# Patient Record
Sex: FEMALE | Race: OTHER | HISPANIC OR LATINO
[De-identification: names, ages, dates, MRNs, and addresses within clinical notes are randomized per-mention and may not be internally consistent; named-entity substitution may affect disease eponyms.]

---
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
## 2021-04-13 ENCOUNTER — TRANSCRIPTION ENCOUNTER (OUTPATIENT)
Age: 21
End: 2021-04-13

## 2021-12-18 ENCOUNTER — EMERGENCY (EMERGENCY)
Facility: HOSPITAL | Age: 21
LOS: 0 days | Discharge: HOME | End: 2021-12-18
Payer: COMMERCIAL

## 2021-12-18 VITALS
TEMPERATURE: 98 F | HEIGHT: 60 IN | OXYGEN SATURATION: 100 % | DIASTOLIC BLOOD PRESSURE: 67 MMHG | SYSTOLIC BLOOD PRESSURE: 110 MMHG | WEIGHT: 119.93 LBS | HEART RATE: 72 BPM | RESPIRATION RATE: 19 BRPM
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
## 2021-12-18 DIAGNOSIS — M79.644 PAIN IN RIGHT FINGER(S): ICD-10-CM
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
## 2021-12-18 LAB — HCG UR QL: NEGATIVE — SIGNIFICANT CHANGE UP

## 2021-12-18 PROCEDURE — 99283 EMERGENCY DEPT VISIT LOW MDM: CPT

## 2021-12-18 PROCEDURE — 73130 X-RAY EXAM OF HAND: CPT | Mod: 26,RT

## 2021-12-18 RX ORDER — ACETAMINOPHEN 500 MG
650 TABLET ORAL ONCE
Refills: 0 | Status: COMPLETED | OUTPATIENT
Start: 2021-12-18 | End: 2021-12-18

## 2021-12-18 RX ORDER — ACETAMINOPHEN 500 MG
1 TABLET ORAL
Qty: 28 | Refills: 0
Start: 2021-12-18 | End: 2021-12-24

## 2021-12-18 RX ADMIN — Medication 650 MILLIGRAM(S): at 18:26

## 2021-12-18 RX ADMIN — Medication 650 MILLIGRAM(S): at 19:00

## 2021-12-18 NOTE — ED PROVIDER NOTE - OBJECTIVE STATEMENT
21 female UCG pending no PMhx presented to the ED with complaint of intermittent sharp right thumb pain x 1 days after trying to punch and over extended right thumb, no radiation, worst with palpation, no alleviating factors. denies fall, injury, trauma, loc, syncope, weakness, tingling, numbness, lost of motion, decrease sensory

## 2021-12-18 NOTE — ED PROVIDER NOTE - NSFOLLOWUPCLINICS_GEN_ALL_ED_FT
[facility] - Primary Care  Primary Care  [address] [place] [address]  Phone: [phone]  Fax:

## 2021-12-18 NOTE — ED PROVIDER NOTE - PATIENT PORTAL LINK FT
You can access the FollowMyHealth Patient Portal offered by [facility] by registering at the following website: [email or web address]. By joining [employer]’s FollowMyHealth portal, you will also be able to view your health information using other applications (apps) compatible with our system.

## 2021-12-18 NOTE — ED PROVIDER NOTE - NSFOLLOWUPINSTRUCTIONS_ED_ALL_ED_FT
Please schedule to follow up within 3 days for right thumb pain     [facility] - Primary Care   Primary Care  [address] [place] [address]  Phone: [phone]  Fax:     please take tylenol 325 mg every 4 hours as needed for pain       please return to the ED for worsening symptoms

## 2021-12-18 NOTE — ED PROVIDER NOTE - CLINICAL SUMMARY MEDICAL DECISION MAKING FREE TEXT BOX
21 female UCG pending no PMhx presented to the ED with complaint of intermittent sharp right thumb pain x 1 days after trying to punch and over extended right thumb    imp :  MSK pain bs soft tissue swelling r/o fx or dislocation     plan   UCG pending   Dx right hand negative   tylenol   rest, ice, compress, elevation   patient education   PCP referral   reassess

## 2021-12-18 NOTE — ED ADULT NURSE NOTE - OBJECTIVE STATEMENT
pt states she was play fighting and right thumb bend  backwards,  pt states she feels tingling. swelling noted around site. no full ROM.

## 2021-12-18 NOTE — ED PROVIDER NOTE - PHYSICAL EXAMINATION
General appearance AAox3 nontoxic looking afebrile sitting in bed in NAD, breathing comfortably, speak full sentence, no tripoding  Head : NCAT, no facial swelling, no bruise, no laceration, non TTP,  EYE : b/l eyes EOMI/PERRL, no nystagmus.   Mouth : Oropharynx moist and patent, no swelling, no edema, no tongue swelling, uvula midline. no exudate, no rash, no lesion   Neck : no cervical/paracervical spine tenderness, no meningeal signs, no neck rigidity, no lymph node swelling. FROM, no body step off  chest : no bruise, no deformity, non TTP, chest expanding equally b/l, no crepitus, no swelling,   lung : CTAB no w/r/r  abdomen : soft non tender, no distended, + BS x 4 quadrant, no guarding, no CVA tenderness, no organomegaly  spine :  moving all extremity equally b/l skin intact, dry not warm to touch. no bruise, no deformity, no bony step off, no erythema, no swelling, no infection. non TTP. FROM, no sensory deficit, distal pulse intact, cap refill < 2 second, able to bare weight. strength 5/5   right hand : right palmar proximal thumb swelling with ttp proximal palmar thumb, skin dry not warm to touch. no bruise, no deformity, no bony step off, no infection. FROM, no sensory deficit, distal pulse intact, cap refill < 2 second,  strength 5/5

## 2022-06-17 ENCOUNTER — EMERGENCY (EMERGENCY)
Facility: HOSPITAL | Age: 22
LOS: 0 days | Discharge: HOME | End: 2022-06-17
Payer: COMMERCIAL

## 2022-06-17 VITALS
RESPIRATION RATE: 19 BRPM | DIASTOLIC BLOOD PRESSURE: 57 MMHG | OXYGEN SATURATION: 98 % | SYSTOLIC BLOOD PRESSURE: 102 MMHG | HEART RATE: 91 BPM | HEIGHT: 60 IN | TEMPERATURE: 99 F | WEIGHT: 130.07 LBS

## 2022-06-17 VITALS
OXYGEN SATURATION: 99 % | HEART RATE: 85 BPM | DIASTOLIC BLOOD PRESSURE: 60 MMHG | RESPIRATION RATE: 20 BRPM | TEMPERATURE: 98 F | SYSTOLIC BLOOD PRESSURE: 105 MMHG

## 2022-06-17 DIAGNOSIS — M25.511 PAIN IN RIGHT SHOULDER: ICD-10-CM
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
## 2022-06-17 DIAGNOSIS — R20.0 ANESTHESIA OF SKIN: ICD-10-CM

## 2022-06-17 DIAGNOSIS — M25.572 PAIN IN LEFT ANKLE AND JOINTS OF LEFT FOOT: ICD-10-CM

## 2022-06-17 DIAGNOSIS — M79.672 PAIN IN LEFT FOOT: ICD-10-CM
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
## 2022-06-17 DIAGNOSIS — R20.2 PARESTHESIA OF SKIN: ICD-10-CM

## 2022-06-17 PROCEDURE — 73630 X-RAY EXAM OF FOOT: CPT | Mod: 26,LT

## 2022-06-17 PROCEDURE — 73600 X-RAY EXAM OF ANKLE: CPT | Mod: 26,LT

## 2022-06-17 PROCEDURE — 73030 X-RAY EXAM OF SHOULDER: CPT | Mod: 26,RT

## 2022-06-17 PROCEDURE — 99284 EMERGENCY DEPT VISIT MOD MDM: CPT

## 2022-06-17 PROCEDURE — 73590 X-RAY EXAM OF LOWER LEG: CPT | Mod: 26,LT

## 2022-06-17 RX ORDER — ACETAMINOPHEN 500 MG
650 TABLET ORAL ONCE
Refills: 0 | Status: COMPLETED | OUTPATIENT
Start: 2022-06-17 | End: 2022-06-17

## 2022-06-17 RX ORDER — IBUPROFEN 200 MG
600 TABLET ORAL ONCE
Refills: 0 | Status: COMPLETED | OUTPATIENT
Start: 2022-06-17 | End: 2022-06-17

## 2022-06-17 RX ADMIN — Medication 600 MILLIGRAM(S): at 21:46

## 2022-06-17 RX ADMIN — Medication 650 MILLIGRAM(S): at 21:46

## 2022-06-17 NOTE — ED PROVIDER NOTE - CARE PROVIDER_API CALL
Jay Victoria)  Orthopaedic Surgery  [address]  [place] [address]  Phone: [phone]  Fax: [phone]  Follow Up Time:

## 2022-06-17 NOTE — ED PROVIDER NOTE - NSFOLLOWUPCLINICS_GEN_ALL_ED_FT
[facility] Orthopedic Surgery  Orthopedic Surgery  [address], 3rd & 4th [place] [address]  Phone: [phone]  Fax:     [facility]  Orthopedic Surgery  [address] [address]  Phone: [phone]  Fax:     [facility]  Podiatry  [address], [facility] - 3rd [place] [address]  Phone: [phone]  Fax:

## 2022-06-17 NOTE — ED PROVIDER NOTE - WR INTERPRETATION DATE TIME  2
17-Jun-2022 21:33 Mohs Method Verbiage: An incision at a 45 degree angle following the standard Mohs approach was done and the specimen was harvested as a microscopic controlled layer.
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
## 2022-06-17 NOTE — ED PROVIDER NOTE - NSFOLLOWUPINSTRUCTIONS_ED_ALL_ED_FT
Today you were seen in the ER for right shoulder and left ankle/foot pain.     Take Motrin 600 mg every 8 hours as needed for moderate pain-- take with food..    Take Tylenol 650mg (Two 325 mg pills) every 4-6 hours as needed for pain.    Rest, Ice, Elevate injured area    Wear ace wrap, ortho shoe and crutches as directed.     Follow-up with Orthopedics or Podiatry, See referred doctor. Call today / next business day for close, prompt follow-up.    Return to Emergency room for any worsening or persistent pain, weakness, numbness, fever, color change to extremity, or any other concerning symptoms.    Advance activity as tolerated.     Continue all previously prescribed medications as directed unless otherwise instructed.     Follow up with your primary care physician in 48-72 hours- bring copies of your results.
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
## 2022-06-17 NOTE — ED PROVIDER NOTE - PATIENT PORTAL LINK FT
You can access the FollowMyHealth Patient Portal offered by [facility] by registering at the following website: [email or web address]. By joining [employer]’s FollowMyHealth portal, you will also be able to view your health information using other applications (apps) compatible with our system.
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
## 2022-06-17 NOTE — ED ADULT NURSE NOTE - SUICIDE SCREENING QUESTION 1
Labor Progress Note    Hang Harris is a 38 y.o. female  at 44w3d  The patient was seen and examined. Her pain is under control with epidural in place.     Vital Signs:  Vitals:    04/10/19 2057 04/10/19 2144 04/10/19 2215 04/10/19 2246   BP: 138/65 118/64 116/64 115/61   Pulse: 72 67 74 64   Resp:  16     Temp:       TempSrc:       SpO2:       Weight:       Height:           FHT: 130, moderate variability, accelerations present, decelerations absent  Contractions: regular, every 3 minutes  Cervical Exam: 4 cm dilated, 60 effaced, -1 station  Pitocin: @ 4 mu/min    Membranes: Intact  Scalp Electrode in place: absent  Intrauterine Pressure Catheter in Place: absent    Assessment/Plan:  Hang Harris is a 38 y.o. female  at 44w3d for elective IOL   - GBS negative, No indication for GBS prophylaxis   - s/p cervidil    - epidural in place   - pitocin per protocol   - continue expectant management     Sania Beaver DO  Ob/Gyn Resident  4/10/2019, 10:51 PM No

## 2022-06-17 NOTE — ED PROVIDER NOTE - PROGRESS NOTE DETAILS
CARLITOS Alfonso: difficulty ambulating secondary to pain on foot. Will give pain medications, ace wrap, surgical shoe and dc with crutches and ortho/podiatry follow up.

## 2022-06-20 ENCOUNTER — FORM ENCOUNTER (OUTPATIENT)
Age: 22
End: 2022-06-20

## 2022-06-21 ENCOUNTER — APPOINTMENT (OUTPATIENT)
Dept: ORTHOPEDIC SURGERY | Facility: CLINIC | Age: 22
End: 2022-06-21
Payer: COMMERCIAL

## 2022-06-21 ENCOUNTER — APPOINTMENT (OUTPATIENT)
Dept: MRI IMAGING | Facility: CLINIC | Age: 22
End: 2022-06-21
Payer: COMMERCIAL

## 2022-06-21 VITALS — BODY MASS INDEX: 26.11 KG/M2 | WEIGHT: 133 LBS | HEIGHT: 60 IN

## 2022-06-21 PROCEDURE — 99204 OFFICE O/P NEW MOD 45 MIN: CPT

## 2022-06-21 PROCEDURE — 73630 X-RAY EXAM OF FOOT: CPT | Mod: LT

## 2022-06-21 PROCEDURE — L4361: CPT

## 2022-06-21 PROCEDURE — 73718 MRI LOWER EXTREMITY W/O DYE: CPT | Mod: LT

## 2022-06-21 NOTE — PHYSICAL EXAM
[Left] : left foot and ankle [Moderate] : moderate swelling of dorsal foot [5___] : [facility] 5[unfilled]/5 [2+] : dorsalis pedis pulse: 2+ [] : ambulation with crutches [de-identified] : splt but reports decreased sensation at toes [de-identified] : plantar flexion 20 degrees [TWNoteComboBox7] : dorsiflexion 5 degrees
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
## 2022-06-23 ENCOUNTER — FORM ENCOUNTER (OUTPATIENT)
Age: 22
End: 2022-06-23

## 2022-06-23 ENCOUNTER — APPOINTMENT (OUTPATIENT)
Dept: ORTHOPEDIC SURGERY | Facility: CLINIC | Age: 22
End: 2022-06-23
Payer: COMMERCIAL

## 2022-06-23 VITALS — HEIGHT: 60 IN | BODY MASS INDEX: 26.11 KG/M2 | WEIGHT: 133 LBS

## 2022-06-23 DIAGNOSIS — S92.242A DISPLACED FRACTURE OF MEDIAL CUNEIFORM OF LEFT FOOT, INITIAL ENCOUNTER FOR CLOSED FRACTURE: ICD-10-CM

## 2022-06-23 PROCEDURE — 99214 OFFICE O/P EST MOD 30 MIN: CPT

## 2022-06-23 PROCEDURE — 73030 X-RAY EXAM OF SHOULDER: CPT | Mod: RT

## 2022-06-23 NOTE — DATA REVIEWED
[MRI] : MRI [Left] : left [Foot] : foot [I reviewed the films/CD and additionally noted] : I reviewed the films/CD and additionally noted [FreeTextEntry1] : lisfranc tear; med cun fx
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
## 2022-06-23 NOTE — PHYSICAL EXAM
[Left] : left foot and ankle [Moderate] : moderate swelling of dorsal foot [5___] : [facility] 5[unfilled]/5 [2+] : dorsalis pedis pulse: 2+ [Right] : right shoulder [4 ___] : forward flexion 4[unfilled]/5 [4___] : internal rotation 4[unfilled]/5 [] : no achilles tendon tenderness [de-identified] : splt but reports decreased sensation at toes [de-identified] : plantar flexion 20 degrees [TWNoteComboBox7] : dorsiflexion 5 degrees

## 2022-06-27 ENCOUNTER — OUTPATIENT (OUTPATIENT)
Dept: OUTPATIENT SERVICES | Facility: HOSPITAL | Age: 22
LOS: 1 days | End: 2022-06-27
Payer: COMMERCIAL

## 2022-06-27 ENCOUNTER — FORM ENCOUNTER (OUTPATIENT)
Age: 22
End: 2022-06-27

## 2022-06-27 VITALS
OXYGEN SATURATION: 97 % | SYSTOLIC BLOOD PRESSURE: 111 MMHG | DIASTOLIC BLOOD PRESSURE: 75 MMHG | HEIGHT: 61 IN | WEIGHT: 130.07 LBS | HEART RATE: 77 BPM | RESPIRATION RATE: 12 BRPM | TEMPERATURE: 98 F

## 2022-06-27 DIAGNOSIS — S92.242A DISPLACED FRACTURE OF MEDIAL CUNEIFORM OF LEFT FOOT, INITIAL ENCOUNTER FOR CLOSED FRACTURE: ICD-10-CM

## 2022-06-27 DIAGNOSIS — Z01.818 ENCOUNTER FOR OTHER PREPROCEDURAL EXAMINATION: ICD-10-CM

## 2022-06-27 LAB
HCG UR QL: NEGATIVE — SIGNIFICANT CHANGE UP
HCT VFR BLD CALC: 38.8 % — SIGNIFICANT CHANGE UP (ref 34.5–45)
HGB BLD-MCNC: 13.3 G/DL — SIGNIFICANT CHANGE UP (ref 11.5–15.5)
MCHC RBC-ENTMCNC: 33.4 PG — SIGNIFICANT CHANGE UP (ref 27–34)
MCHC RBC-ENTMCNC: 34.3 GM/DL — SIGNIFICANT CHANGE UP (ref 32–36)
MCV RBC AUTO: 97.5 FL — SIGNIFICANT CHANGE UP (ref 80–100)
NRBC # BLD: 0 /100 WBCS — SIGNIFICANT CHANGE UP (ref 0–0)
PLATELET # BLD AUTO: 225 K/UL — SIGNIFICANT CHANGE UP (ref 150–400)
RBC # BLD: 3.98 M/UL — SIGNIFICANT CHANGE UP (ref 3.8–5.2)
RBC # FLD: 12.5 % — SIGNIFICANT CHANGE UP (ref 10.3–14.5)
SARS-COV-2 RNA SPEC QL NAA+PROBE: SIGNIFICANT CHANGE UP
WBC # BLD: 3.52 K/UL — LOW (ref 3.8–10.5)
WBC # FLD AUTO: 3.52 K/UL — LOW (ref 3.8–10.5)

## 2022-06-27 PROCEDURE — U0003: CPT

## 2022-06-27 PROCEDURE — 81025 URINE PREGNANCY TEST: CPT

## 2022-06-27 PROCEDURE — G0463: CPT

## 2022-06-27 PROCEDURE — 36415 COLL VENOUS BLD VENIPUNCTURE: CPT

## 2022-06-27 PROCEDURE — U0005: CPT

## 2022-06-27 PROCEDURE — 85027 COMPLETE CBC AUTOMATED: CPT

## 2022-06-27 NOTE — H&P PST ADULT - MUSCULOSKELETAL
left foot/ROM intact/no joint swelling/no joint erythema/no joint warmth/no calf tenderness/decreased ROM/decreased ROM due to pain/normal gait/strength 5/5 bilateral upper extremities/decreased strength details…

## 2022-06-27 NOTE — H&P PST ADULT - NSANTHOSAYNRD_GEN_A_CORE
No. PATRICIA screening performed.  STOP BANG Legend: 0-2 = LOW Risk; 3-4 = INTERMEDIATE Risk; 5-8 = HIGH Risk neck inches/No. PATRICIA screening performed.  STOP BANG Legend: 0-2 = LOW Risk; 3-4 = INTERMEDIATE Risk; 5-8 = HIGH Risk neck 13 inches/No. PATRICIA screening performed.  STOP BANG Legend: 0-2 = LOW Risk; 3-4 = INTERMEDIATE Risk; 5-8 = HIGH Risk
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
## 2022-06-27 NOTE — H&P PST ADULT - NSICDXPASTMEDICALHX_GEN_ALL_CORE_FT
PAST MEDICAL HISTORY:  COVID-19 November 2021, cold like symptoms and lost taste and smeel which have returned    COVID-19 vaccine series completed     Displaced fracture of medial cuneiform of left foot

## 2022-06-27 NOTE — H&P PST ADULT - NSICDXFAMILYHX_GEN_ALL_CORE_FT
FAMILY HISTORY:  Father  Still living? Yes, Estimated age: 51-60  Family history of type 2 diabetes mellitus, Age at diagnosis: Age Unknown    Grandparent  Still living? No  Family history of cardiac pacemaker, Age at diagnosis: Age Unknown  Family history of hypertension, Age at diagnosis: Age Unknown  Family history of type 2 diabetes mellitus, Age at diagnosis: Age Unknown  Family history of hypertension, Age at diagnosis: Age Unknown  Family history of type 2 diabetes mellitus, Age at diagnosis: Age Unknown

## 2022-06-27 NOTE — H&P PST ADULT - RESPIRATORY
clear to auscultation bilaterally/no wheezes/no rales/no rhonchi/no respiratory distress/breath sounds equal/good air movement/respirations non-labored
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
## 2022-06-28 ENCOUNTER — APPOINTMENT (OUTPATIENT)
Dept: MRI IMAGING | Facility: CLINIC | Age: 22
End: 2022-06-28

## 2022-06-28 ENCOUNTER — TRANSCRIPTION ENCOUNTER (OUTPATIENT)
Age: 22
End: 2022-06-28

## 2022-06-28 PROCEDURE — 73221 MRI JOINT UPR EXTREM W/O DYE: CPT | Mod: RT

## 2022-06-29 ENCOUNTER — OUTPATIENT (OUTPATIENT)
Dept: OUTPATIENT SERVICES | Facility: HOSPITAL | Age: 22
LOS: 1 days | End: 2022-06-29
Payer: COMMERCIAL

## 2022-06-29 ENCOUNTER — APPOINTMENT (OUTPATIENT)
Dept: ORTHOPEDIC SURGERY | Facility: HOSPITAL | Age: 22
End: 2022-06-29

## 2022-06-29 ENCOUNTER — TRANSCRIPTION ENCOUNTER (OUTPATIENT)
Age: 22
End: 2022-06-29

## 2022-06-29 VITALS
RESPIRATION RATE: 13 BRPM | SYSTOLIC BLOOD PRESSURE: 107 MMHG | OXYGEN SATURATION: 100 % | HEART RATE: 72 BPM | DIASTOLIC BLOOD PRESSURE: 64 MMHG

## 2022-06-29 VITALS
RESPIRATION RATE: 15 BRPM | HEART RATE: 72 BPM | SYSTOLIC BLOOD PRESSURE: 111 MMHG | WEIGHT: 134.7 LBS | OXYGEN SATURATION: 98 % | DIASTOLIC BLOOD PRESSURE: 63 MMHG | HEIGHT: 60 IN

## 2022-06-29 DIAGNOSIS — S92.242A DISPLACED FRACTURE OF MEDIAL CUNEIFORM OF LEFT FOOT, INITIAL ENCOUNTER FOR CLOSED FRACTURE: ICD-10-CM

## 2022-06-29 PROCEDURE — 28450 TX TARSAL B1 FX W/O MNPJ EA: CPT | Mod: LT

## 2022-06-29 PROCEDURE — 97161 PT EVAL LOW COMPLEX 20 MIN: CPT

## 2022-06-29 PROCEDURE — 28465 OPTX TARSAL BONE FX EACH: CPT | Mod: 59,LT

## 2022-06-29 PROCEDURE — 28615 REPAIR FOOT DISLOCATION: CPT | Mod: LT

## 2022-06-29 PROCEDURE — 73630 X-RAY EXAM OF FOOT: CPT | Mod: 26,LT

## 2022-06-29 PROCEDURE — 76000 FLUOROSCOPY <1 HR PHYS/QHP: CPT

## 2022-06-29 PROCEDURE — 29515 APPLICATION SHORT LEG SPLINT: CPT | Mod: 59,LT

## 2022-06-29 PROCEDURE — C1713: CPT

## 2022-06-29 DEVICE — K WIRE 1.6 X 150MM: Type: IMPLANTABLE DEVICE | Site: LEFT | Status: FUNCTIONAL

## 2022-06-29 DEVICE — IMPLANTABLE DEVICE: Type: IMPLANTABLE DEVICE | Site: LEFT | Status: FUNCTIONAL

## 2022-06-29 RX ORDER — MEPERIDINE HYDROCHLORIDE 50 MG/ML
12.5 INJECTION INTRAMUSCULAR; INTRAVENOUS; SUBCUTANEOUS
Refills: 0 | Status: DISCONTINUED | OUTPATIENT
Start: 2022-06-29 | End: 2022-06-30

## 2022-06-29 RX ORDER — CHLORHEXIDINE GLUCONATE 213 G/1000ML
1 SOLUTION TOPICAL ONCE
Refills: 0 | Status: COMPLETED | OUTPATIENT
Start: 2022-06-29 | End: 2022-06-29

## 2022-06-29 RX ORDER — FENTANYL CITRATE 50 UG/ML
25 INJECTION INTRAVENOUS
Refills: 0 | Status: DISCONTINUED | OUTPATIENT
Start: 2022-06-29 | End: 2022-06-30

## 2022-06-29 RX ORDER — APREPITANT 80 MG/1
40 CAPSULE ORAL ONCE
Refills: 0 | Status: COMPLETED | OUTPATIENT
Start: 2022-06-29 | End: 2022-06-29

## 2022-06-29 RX ORDER — CEFAZOLIN SODIUM 1 G
2000 VIAL (EA) INJECTION ONCE
Refills: 0 | Status: COMPLETED | OUTPATIENT
Start: 2022-06-29 | End: 2022-06-29

## 2022-06-29 RX ORDER — FENTANYL CITRATE 50 UG/ML
50 INJECTION INTRAVENOUS
Refills: 0 | Status: DISCONTINUED | OUTPATIENT
Start: 2022-06-29 | End: 2022-06-30

## 2022-06-29 RX ORDER — ONDANSETRON 8 MG/1
4 TABLET, FILM COATED ORAL ONCE
Refills: 0 | Status: DISCONTINUED | OUTPATIENT
Start: 2022-06-29 | End: 2022-06-30

## 2022-06-29 RX ORDER — SODIUM CHLORIDE 9 MG/ML
1000 INJECTION, SOLUTION INTRAVENOUS
Refills: 0 | Status: DISCONTINUED | OUTPATIENT
Start: 2022-06-29 | End: 2022-07-13

## 2022-06-29 RX ORDER — ASPIRIN/CALCIUM CARB/MAGNESIUM 324 MG
1 TABLET ORAL
Qty: 14 | Refills: 0
Start: 2022-06-29 | End: 2022-07-12

## 2022-06-29 RX ADMIN — APREPITANT 40 MILLIGRAM(S): 80 CAPSULE ORAL at 11:04

## 2022-06-29 RX ADMIN — SODIUM CHLORIDE 75 MILLILITER(S): 9 INJECTION, SOLUTION INTRAVENOUS at 15:05

## 2022-06-29 RX ADMIN — CHLORHEXIDINE GLUCONATE 1 APPLICATION(S): 213 SOLUTION TOPICAL at 11:04

## 2022-06-29 NOTE — ASU DISCHARGE PLAN (ADULT/PEDIATRIC) - CARE PROVIDER_API CALL
Tushar Lopez)  Orthopaedic Surgery  [address] [place] [address]  Phone: [phone]  Fax: [phone]  Follow Up Time:

## 2022-06-29 NOTE — ASU PATIENT PROFILE, ADULT - FALL HARM RISK - HARM RISK INTERVENTIONS
Communicate Risk of Fall with Harm to all staff/Reinforce activity limits and safety measures with patient and family/Tailored Fall Risk Interventions/Visual Cue: Yellow wristband and red socks/Bed in lowest position, wheels locked, appropriate side rails in place/Call bell, personal items and telephone in reach/Instruct patient to call for assistance before getting out of bed or chair/Non-slip footwear when patient is out of bed/[place] to call system/Physically safe environment - no spills, clutter or unnecessary equipment/Purposeful Proactive Rounding/Room/bathroom lighting operational, light cord in reach Assistance with ambulation/Assistance OOB with selected safe patient handling equipment/Communicate Risk of Fall with Harm to all staff/Discuss with provider need for PT consult/Monitor gait and stability/Provide patient with walking aids - walker, cane, crutches/Reinforce activity limits and safety measures with patient and family/Sit up slowly, dangle for a short time, stand at bedside before walking/Tailored Fall Risk Interventions/Use of alarms - bed, chair and/or voice tab/Visual Cue: Yellow wristband and red socks/Bed in lowest position, wheels locked, appropriate side rails in place/Call bell, personal items and telephone in reach/Instruct patient to call for assistance before getting out of bed or chair/Non-slip footwear when patient is out of bed/[place] to call system/Physically safe environment - no spills, clutter or unnecessary equipment/Purposeful Proactive Rounding/Room/bathroom lighting operational, light cord in reach

## 2022-06-29 NOTE — PHYSICAL THERAPY INITIAL EVALUATION ADULT - ADDITIONAL COMMENTS
pt lives with mom in house with 15 steps. Has own crutches. Was bumping up and down steps on buttocks

## 2022-06-29 NOTE — ASU DISCHARGE PLAN (ADULT/PEDIATRIC) - NS MD DC FALL RISK RISK
For information on Fall & Injury Prevention, visit: [email or web address] OR  [email or web address] OR  https://www.cdc.gov/steadi/patient.html

## 2022-06-29 NOTE — ASU PATIENT PROFILE, ADULT - PRO MENTAL HEALTH SX RECENT
HPI: Abdomen


Time Seen by Provider: 11/24/18 22:42


Chief Complaint (Nursing): Abdominal Pain


Chief Complaint (Provider): Lower abdominal pain, vomiting x 1 month


History Per: Patient


History/Exam Limitations: no limitations


Onset/Duration Of Symptoms: Days


Outside of [place] travel?: No


Current Symptoms Are (Timing): Still Present


Additional Complaint(s): 





33 yo female with no medical problems presents for evaluation of lower abdominal

pain and vomiting. Pt states she has been vomiting intermittently x 1 month. PT 

states the last 4 days has been worse. Pt denies dysuria, pain pain, 

fever/chills. No vaginal pain, no vaginal discharge 


Abnormal Vaginal Bleeding: No





Past Medical History


Reviewed: Historical Data, Nursing Documentation, Vital Signs


Vital Signs: 





                                Last Vital Signs











Temp  98.3 F   11/24/18 22:45


 


Pulse  76   11/24/18 22:45


 


Resp  18   11/24/18 22:45


 


BP  103/65   11/24/18 22:45


 


Pulse Ox  100   11/24/18 22:45














- Medical History


PMH: No Chronic Diseases





- Surgical History


Surgical History: No Surg Hx





- Family History


Family History: States: No Known Family Hx





- Living Arrangements


Living Arrangements: With Family





- Home Medications


Home Medications: 


                                Ambulatory Orders











 Medication  Instructions  Recorded


 


Naproxen [Naprosyn Tab] 375 mg PO Q8 PRN #21 tab 09/23/17


 


Nitrofurantoin Macrocrystals 100 mg PO BID #10 cap 11/25/18





[Macrobid]  


 


Ondansetron ODT [Zofran ODT] 4 mg PO QID #20 odt 11/25/18














- Allergies


Allergies/Adverse Reactions: 


                                    Allergies











Allergy/AdvReac Type Severity Reaction Status Date / Time


 


No Known Allergies Allergy   Verified 09/22/17 20:36














Review of Systems


ROS Statement: Except As Marked, All Systems Reviewed And Found Negative


Constitutional: Negative for: Fever, Chills, Sweats, Weakness, Malaise


Gastrointestinal: Positive for: Nausea, Vomiting.  Negative for: Abdominal Pain,

Diarrhea, Rectal Pain


Genitourinary Female: Positive for: Pelvic Pain.  Negative for: Frequency, 

Incontinence, Hematuria, Vaginal Discharge, Vaginal Bleeding


Musculoskeletal: Negative for: Neck Pain





Physical Exam





- Reviewed


Nursing Documentation Reviewed: Yes


Vital Signs Reviewed: Yes





- Physical Exam


Appears: Positive for: Well, Non-toxic, No Acute Distress


Head Exam: Positive for: ATRAUMATIC, NORMAL INSPECTION, NORMOCEPHALIC


Skin: Positive for: Normal Color, Warm, DRY


Eye Exam: Positive for: Normal appearance


ENT: Positive for: Normal ENT Inspection


Neck: Positive for: Normal, Painless ROM


Cardiovascular/Chest: Positive for: Regular Rate, Rhythm


Respiratory: Positive for: Normal Breath Sounds.  Negative for: Accessory Muscle

Use, Respiratory Distress


Gastrointestinal/Abdominal: Positive for: Normal Exam, Soft.  Negative for: 

Tenderness, Distended, Guarding, Rebound


Back: Positive for: Normal Inspection


Extremity: Positive for: Normal ROM


Neurologic/Psych: Positive for: Alert, Oriented





- Laboratory Results


Result Diagrams: 


                                 11/24/18 23:11





                                 11/24/18 23:11





- ECG


O2 Sat by Pulse Oximetry: 100





Medical Decision Making


Medical Decision Making: 





Labs normal. 


Urine (+) leuks 





Disposition





- Clinical Impression


Clinical Impression: 


 UTI (urinary tract infection)








- Patient ED Disposition


Is Patient to be Admitted: No


Counseled Patient/Family Regarding: Diagnosis, Need For Followup, Rx Given





- Disposition


Disposition: Routine/Home


Disposition Time: 02:47


Condition: GOOD


Prescriptions: 


Nitrofurantoin Macrocrystals [Macrobid] 100 mg PO BID #10 cap


Ondansetron ODT [Zofran ODT] 4 mg PO QID #20 odt


Instructions:  Urinary Tract Infection, Adult (DC)


Forms:  [facility] (English)


Print Language: [place]
none
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
## 2022-07-05 ENCOUNTER — APPOINTMENT (OUTPATIENT)
Dept: ORTHOPEDIC SURGERY | Facility: CLINIC | Age: 22
End: 2022-07-05

## 2022-07-05 VITALS — HEIGHT: 60 IN | BODY MASS INDEX: 26.11 KG/M2 | WEIGHT: 133 LBS

## 2022-07-05 PROBLEM — S92.242A: Chronic | Status: ACTIVE | Noted: 2022-06-27

## 2022-07-05 PROBLEM — U07.1 COVID-19: Chronic | Status: ACTIVE | Noted: 2022-06-27

## 2022-07-05 PROCEDURE — 99214 OFFICE O/P EST MOD 30 MIN: CPT

## 2022-07-05 NOTE — PHYSICAL EXAM
[Right] : right shoulder [5 ___] : forward flexion 5[unfilled]/5 [5___] : internal rotation 5[unfilled]/5 [] : negative shoulder apprehension [TWNoteComboBox7] : active forward flexion 160 degrees [de-identified] : external rotation at 90 degrees of abduction 80 degrees [TWNoteComboBox5] : internal rotation at 90 degrees of abduction 90 degrees

## 2022-07-05 NOTE — DATA REVIEWED
[MRI] : MRI [Right] : of the right [Shoulder] : shoulder [I independently reviewed and interpreted images and report] : I independently reviewed and interpreted images and report [FreeTextEntry1] : ant humeral bone edema, possible posterior dislocation, no labral tear

## 2022-07-11 ENCOUNTER — APPOINTMENT (OUTPATIENT)
Dept: ORTHOPEDIC SURGERY | Facility: CLINIC | Age: 22
End: 2022-07-11

## 2022-07-11 VITALS — BODY MASS INDEX: 26.11 KG/M2 | WEIGHT: 133 LBS | HEIGHT: 60 IN

## 2022-07-11 PROCEDURE — 99024 POSTOP FOLLOW-UP VISIT: CPT

## 2022-07-11 NOTE — ASSESSMENT
[FreeTextEntry1] : nwb in cam boot\par ice/elevate\par keep incision dry\par nsaids prn\par f/up 2 wks w/ foot xray
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
## 2022-07-11 NOTE — PHYSICAL EXAM
[Left] : left foot and ankle [5___] : [facility] 5[unfilled]/5 [] : ambulation with crutches [de-identified] : damien

## 2022-07-25 ENCOUNTER — APPOINTMENT (OUTPATIENT)
Dept: ORTHOPEDIC SURGERY | Facility: CLINIC | Age: 22
End: 2022-07-25

## 2022-07-25 VITALS — WEIGHT: 133 LBS | HEIGHT: 60 IN | BODY MASS INDEX: 26.11 KG/M2

## 2022-07-25 PROCEDURE — 73630 X-RAY EXAM OF FOOT: CPT | Mod: LT

## 2022-07-25 PROCEDURE — 99024 POSTOP FOLLOW-UP VISIT: CPT

## 2022-07-25 NOTE — ASSESSMENT
[FreeTextEntry1] : nwb in cam boot\par ice/elevate\par discussed wound care\par nsaids prn\par f/up 2 wks w/ foot xray

## 2022-07-25 NOTE — PHYSICAL EXAM
[Left] : left foot and ankle [5___] : [facility] 5[unfilled]/5 [] : no calf tenderness [de-identified] : damien
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
## 2022-07-25 NOTE — IMAGING
[Left] : left foot [No loss of surgical correlation. Bony alignment acceptable. Hardware in appropriate position] : No loss of surgical correlation. Bony alignment acceptable. Hardware in appropriate position

## 2022-07-25 NOTE — REVIEW OF SYSTEMS
[Joint Swelling] : joint swelling [Joint Pain] : no joint pain [Joint Stiffness] : no joint stiffness

## 2022-08-08 ENCOUNTER — APPOINTMENT (OUTPATIENT)
Dept: ORTHOPEDIC SURGERY | Facility: CLINIC | Age: 22
End: 2022-08-08

## 2022-08-08 VITALS — WEIGHT: 133 LBS | BODY MASS INDEX: 26.11 KG/M2 | HEIGHT: 60 IN

## 2022-08-08 PROCEDURE — 73630 X-RAY EXAM OF FOOT: CPT | Mod: LT

## 2022-08-08 PROCEDURE — 99024 POSTOP FOLLOW-UP VISIT: CPT

## 2022-08-08 NOTE — ASSESSMENT
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

## 2022-08-08 NOTE — PHYSICAL EXAM
[Left] : left foot and ankle [] : no calf tenderness [5___] : plantar flexion 5[unfilled]/5 [de-identified] : damien
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
## 2022-08-17 ENCOUNTER — APPOINTMENT (OUTPATIENT)
Dept: ORTHOPEDIC SURGERY | Facility: CLINIC | Age: 22
End: 2022-08-17

## 2022-08-17 VITALS — HEIGHT: 60 IN | BODY MASS INDEX: 26.11 KG/M2 | WEIGHT: 133 LBS

## 2022-08-17 DIAGNOSIS — S43.014A ANTERIOR DISLOCATION OF RIGHT HUMERUS, INITIAL ENCOUNTER: ICD-10-CM

## 2022-08-17 PROCEDURE — 99213 OFFICE O/P EST LOW 20 MIN: CPT | Mod: 24

## 2022-08-17 NOTE — PHYSICAL EXAM
[Right] : right shoulder [5 ___] : forward flexion 5[unfilled]/5 [5___] : internal rotation 5[unfilled]/5 [] : negative shoulder apprehension [TWNoteComboBox7] : active forward flexion 0 degrees [de-identified] : external rotation at 90 degrees of abduction 80 degrees [TWNoteComboBox5] : internal rotation at 90 degrees of abduction 90 degrees
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
## 2022-08-17 NOTE — ASSESSMENT
[FreeTextEntry1] : R shoulder possible posterior subluxation event.\par MRI reviewed- ant humeral bone edema, possible posterior dislocation, no labral tear\par She is significantly better after completing PT. She will continue with HEP.\par Activity as tolerated. \par RTO PRN

## 2022-09-06 ENCOUNTER — APPOINTMENT (OUTPATIENT)
Dept: ORTHOPEDIC SURGERY | Facility: CLINIC | Age: 22
End: 2022-09-06

## 2022-09-06 VITALS — BODY MASS INDEX: 26.11 KG/M2 | WEIGHT: 133 LBS | HEIGHT: 60 IN

## 2022-09-06 PROCEDURE — 73630 X-RAY EXAM OF FOOT: CPT | Mod: LT

## 2022-09-06 PROCEDURE — 99024 POSTOP FOLLOW-UP VISIT: CPT
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
## 2022-09-06 NOTE — ASSESSMENT
[FreeTextEntry1] : wbat in boot\par PT\par ice/elevate\par dsicussed screw removal - would like to proceed in future\par f/up 3 wks w/ foot xray\par desk work\par \par The pros, cons, risks, benefits, and alternatives have been discussed.  The risks include but are not limited to infection, bleeding, injury to small nerves and blood vessels, pain, stiffness, progression, dvt, PE, amputation and death. Expected recovery time was discussed. All the patient's questions were answered and they would like to proceed.\par

## 2022-09-06 NOTE — PHYSICAL EXAM
[Left] : left foot and ankle [[facility]___[facility][unfilled]/5 [] : patient ambulates without assistive device [de-identified] : damien

## 2022-09-22 ENCOUNTER — OUTPATIENT (OUTPATIENT)
Dept: OUTPATIENT SERVICES | Facility: HOSPITAL | Age: 22
LOS: 1 days | End: 2022-09-22
Payer: COMMERCIAL

## 2022-09-22 VITALS
RESPIRATION RATE: 16 BRPM | HEART RATE: 68 BPM | HEIGHT: 60 IN | DIASTOLIC BLOOD PRESSURE: 68 MMHG | TEMPERATURE: 98 F | SYSTOLIC BLOOD PRESSURE: 112 MMHG | WEIGHT: 134.92 LBS | OXYGEN SATURATION: 99 %

## 2022-09-22 DIAGNOSIS — Z98.890 OTHER SPECIFIED POSTPROCEDURAL STATES: Chronic | ICD-10-CM

## 2022-09-22 DIAGNOSIS — Z01.818 ENCOUNTER FOR OTHER PREPROCEDURAL EXAMINATION: ICD-10-CM

## 2022-09-22 DIAGNOSIS — S92.242D: ICD-10-CM

## 2022-09-22 LAB
HCT VFR BLD CALC: 38.3 % — SIGNIFICANT CHANGE UP (ref 34.5–45)
HGB BLD-MCNC: 12.8 G/DL — SIGNIFICANT CHANGE UP (ref 11.5–15.5)
MCHC RBC-ENTMCNC: 33.2 PG — SIGNIFICANT CHANGE UP (ref 27–34)
MCHC RBC-ENTMCNC: 33.4 GM/DL — SIGNIFICANT CHANGE UP (ref 32–36)
MCV RBC AUTO: 99.2 FL — SIGNIFICANT CHANGE UP (ref 80–100)
NRBC # BLD: 0 /100 WBCS — SIGNIFICANT CHANGE UP (ref 0–0)
PLATELET # BLD AUTO: 223 K/UL — SIGNIFICANT CHANGE UP (ref 150–400)
RBC # BLD: 3.86 M/UL — SIGNIFICANT CHANGE UP (ref 3.8–5.2)
RBC # FLD: 12.8 % — SIGNIFICANT CHANGE UP (ref 10.3–14.5)
WBC # BLD: 4.49 K/UL — SIGNIFICANT CHANGE UP (ref 3.8–10.5)
WBC # FLD AUTO: 4.49 K/UL — SIGNIFICANT CHANGE UP (ref 3.8–10.5)

## 2022-09-22 PROCEDURE — G0463: CPT

## 2022-09-22 PROCEDURE — 36415 COLL VENOUS BLD VENIPUNCTURE: CPT

## 2022-09-22 PROCEDURE — 85027 COMPLETE CBC AUTOMATED: CPT

## 2022-10-03 ENCOUNTER — APPOINTMENT (OUTPATIENT)
Dept: ORTHOPEDIC SURGERY | Facility: CLINIC | Age: 22
End: 2022-10-03

## 2022-10-03 PROCEDURE — 99214 OFFICE O/P EST MOD 30 MIN: CPT

## 2022-10-03 PROCEDURE — 73630 X-RAY EXAM OF FOOT: CPT | Mod: LT

## 2022-10-03 NOTE — ASSESSMENT
[FreeTextEntry1] : wbat in boot\par ice/elevate\par dsicussed screw removal - would like to proceed\par \par \par The pros, cons, risks, benefits, and alternatives have been discussed.  The risks include but are not limited to infection, bleeding, injury to small nerves and blood vessels, pain, stiffness, progression, dvt, PE, amputation and death. Expected recovery time was discussed. All the patient's questions were answered and they would like to proceed.\par
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
## 2022-10-05 ENCOUNTER — OUTPATIENT (OUTPATIENT)
Dept: OUTPATIENT SERVICES | Facility: HOSPITAL | Age: 22
LOS: 1 days | End: 2022-10-05
Payer: COMMERCIAL

## 2022-10-05 DIAGNOSIS — Z20.828 CONTACT WITH AND (SUSPECTED) EXPOSURE TO OTHER VIRAL COMMUNICABLE DISEASES: ICD-10-CM

## 2022-10-05 DIAGNOSIS — Z98.890 OTHER SPECIFIED POSTPROCEDURAL STATES: Chronic | ICD-10-CM

## 2022-10-05 DIAGNOSIS — S92.242D: ICD-10-CM

## 2022-10-05 DIAGNOSIS — Z01.818 ENCOUNTER FOR OTHER PREPROCEDURAL EXAMINATION: ICD-10-CM

## 2022-10-05 LAB — SARS-COV-2 RNA SPEC QL NAA+PROBE: SIGNIFICANT CHANGE UP

## 2022-10-05 PROCEDURE — U0005: CPT

## 2022-10-05 PROCEDURE — U0003: CPT

## 2022-10-06 ENCOUNTER — TRANSCRIPTION ENCOUNTER (OUTPATIENT)
Age: 22
End: 2022-10-06

## 2022-10-07 ENCOUNTER — APPOINTMENT (OUTPATIENT)
Dept: ORTHOPEDIC SURGERY | Facility: HOSPITAL | Age: 22
End: 2022-10-07

## 2022-10-07 ENCOUNTER — TRANSCRIPTION ENCOUNTER (OUTPATIENT)
Age: 22
End: 2022-10-07

## 2022-10-07 ENCOUNTER — OUTPATIENT (OUTPATIENT)
Dept: OUTPATIENT SERVICES | Facility: HOSPITAL | Age: 22
LOS: 1 days | End: 2022-10-07
Payer: COMMERCIAL

## 2022-10-07 VITALS
RESPIRATION RATE: 17 BRPM | TEMPERATURE: 98 F | HEART RATE: 77 BPM | SYSTOLIC BLOOD PRESSURE: 111 MMHG | OXYGEN SATURATION: 100 % | WEIGHT: 139.99 LBS | HEIGHT: 60 IN | DIASTOLIC BLOOD PRESSURE: 61 MMHG

## 2022-10-07 VITALS
RESPIRATION RATE: 16 BRPM | SYSTOLIC BLOOD PRESSURE: 110 MMHG | HEART RATE: 80 BPM | DIASTOLIC BLOOD PRESSURE: 61 MMHG | OXYGEN SATURATION: 100 %

## 2022-10-07 DIAGNOSIS — S92.242D: ICD-10-CM

## 2022-10-07 DIAGNOSIS — Z01.818 ENCOUNTER FOR OTHER PREPROCEDURAL EXAMINATION: ICD-10-CM

## 2022-10-07 DIAGNOSIS — Z98.890 OTHER SPECIFIED POSTPROCEDURAL STATES: Chronic | ICD-10-CM

## 2022-10-07 LAB — HCG UR QL: NEGATIVE — SIGNIFICANT CHANGE UP

## 2022-10-07 PROCEDURE — 81025 URINE PREGNANCY TEST: CPT

## 2022-10-07 PROCEDURE — 73630 X-RAY EXAM OF FOOT: CPT | Mod: 26,LT

## 2022-10-07 PROCEDURE — 20680 REMOVAL OF IMPLANT DEEP: CPT | Mod: LT

## 2022-10-07 PROCEDURE — 76000 FLUOROSCOPY <1 HR PHYS/QHP: CPT

## 2022-10-07 PROCEDURE — 20680 REMOVAL OF IMPLANT DEEP: CPT

## 2022-10-07 PROCEDURE — 97161 PT EVAL LOW COMPLEX 20 MIN: CPT

## 2022-10-07 RX ORDER — HYDROCODONE BITARTRATE AND ACETAMINOPHEN 7.5; 325 MG/15ML; MG/15ML
1 SOLUTION ORAL
Qty: 12 | Refills: 0
Start: 2022-10-07

## 2022-10-07 RX ORDER — HYDROMORPHONE HYDROCHLORIDE 2 MG/ML
0.5 INJECTION INTRAMUSCULAR; INTRAVENOUS; SUBCUTANEOUS
Refills: 0 | Status: DISCONTINUED | OUTPATIENT
Start: 2022-10-07 | End: 2022-10-07

## 2022-10-07 RX ORDER — SODIUM CHLORIDE 9 MG/ML
1000 INJECTION, SOLUTION INTRAVENOUS
Refills: 0 | Status: DISCONTINUED | OUTPATIENT
Start: 2022-10-07 | End: 2022-10-07

## 2022-10-07 RX ORDER — CEFAZOLIN SODIUM 1 G
2000 VIAL (EA) INJECTION ONCE
Refills: 0 | Status: COMPLETED | OUTPATIENT
Start: 2022-10-07 | End: 2022-10-07

## 2022-10-07 RX ORDER — CHLORHEXIDINE GLUCONATE 213 G/1000ML
1 SOLUTION TOPICAL ONCE
Refills: 0 | Status: COMPLETED | OUTPATIENT
Start: 2022-10-07 | End: 2022-10-07

## 2022-10-07 RX ORDER — OXYCODONE HYDROCHLORIDE 5 MG/1
5 TABLET ORAL ONCE
Refills: 0 | Status: DISCONTINUED | OUTPATIENT
Start: 2022-10-07 | End: 2022-10-07

## 2022-10-07 RX ORDER — ONDANSETRON 8 MG/1
4 TABLET, FILM COATED ORAL ONCE
Refills: 0 | Status: DISCONTINUED | OUTPATIENT
Start: 2022-10-07 | End: 2022-10-07

## 2022-10-07 RX ORDER — APREPITANT 80 MG/1
40 CAPSULE ORAL ONCE
Refills: 0 | Status: COMPLETED | OUTPATIENT
Start: 2022-10-07 | End: 2022-10-07

## 2022-10-07 RX ADMIN — SODIUM CHLORIDE 75 MILLILITER(S): 9 INJECTION, SOLUTION INTRAVENOUS at 07:29

## 2022-10-07 RX ADMIN — APREPITANT 40 MILLIGRAM(S): 80 CAPSULE ORAL at 07:28

## 2022-10-07 RX ADMIN — CHLORHEXIDINE GLUCONATE 1 APPLICATION(S): 213 SOLUTION TOPICAL at 06:43

## 2022-10-07 RX ADMIN — SODIUM CHLORIDE 75 MILLILITER(S): 9 INJECTION, SOLUTION INTRAVENOUS at 09:30

## 2022-10-07 NOTE — ASU PATIENT PROFILE, ADULT - FALL HARM RISK - HARM RISK INTERVENTIONS
Assistance with ambulation/Assistance OOB with selected safe patient handling equipment/Communicate Risk of Fall with Harm to all staff/Discuss with provider need for PT consult/Monitor gait and stability/Provide patient with walking aids - walker, cane, crutches/Reinforce activity limits and safety measures with patient and family/Sit up slowly, dangle for a short time, stand at bedside before walking/Tailored Fall Risk Interventions/Use of alarms - bed, chair and/or voice tab/Visual Cue: Yellow wristband and red socks/Bed in lowest position, wheels locked, appropriate side rails in place/Call bell, personal items and telephone in reach/Instruct patient to call for assistance before getting out of bed or chair/Non-slip footwear when patient is out of bed/[place] to call system/Physically safe environment - no spills, clutter or unnecessary equipment/Purposeful Proactive Rounding/Room/bathroom lighting operational, light cord in reach

## 2022-10-07 NOTE — BRIEF OPERATIVE NOTE - NSICDXBRIEFPREOP_GEN_ALL_CORE_FT
PRE-OP DIAGNOSIS:  Disp fracture of medial cuneiform of left foot with routine healing 07-Oct-2022 06:53:06  Jerrod Banks

## 2022-10-07 NOTE — PHYSICAL THERAPY INITIAL EVALUATION ADULT - ACTIVE RANGE OF MOTION EXAMINATION, REHAB EVAL
Left hip and knee WNL, left foot NT due to sx/sherry. upper extremity Active ROM was WNL (within normal limits)/RLE Active ROM was WNL (within normal limits)/deficits as listed below

## 2022-10-07 NOTE — ASU DISCHARGE PLAN (ADULT/PEDIATRIC) - NS MD DC FALL RISK RISK
For information on Fall & Injury Prevention, visit: [email or web address] OR  [email or web address] OR  https://www.cdc.gov/steadi/patient.html

## 2022-10-07 NOTE — PHYSICAL THERAPY INITIAL EVALUATION ADULT - PLANNED THERAPY INTERVENTIONS, PT EVAL
Pt seen in PACU for transfer/ambulation training with cane WBAT left LE and education on stairs. Pt at safe functional levele for d/c to home. Given cane. Has post op shoe on left foot. Cleared from PT

## 2022-10-07 NOTE — ASU DISCHARGE PLAN (ADULT/PEDIATRIC) - CALL YOUR DOCTOR IF YOU HAVE ANY OF THE FOLLOWING:
Swelling that gets worse/Pain not relieved by Medications/Fever greater than (need to indicate Fahrenheit or Celsius)/Numbness, tingling, color or temperature change to extremity Swelling that gets worse/Pain not relieved by Medications/Fever greater than (need to indicate Fahrenheit or Celsius)/Wound/Surgical Site with redness, or foul smelling discharge or pus/Numbness, tingling, color or temperature change to extremity

## 2022-10-07 NOTE — BRIEF OPERATIVE NOTE - NSICDXBRIEFPOSTOP_GEN_ALL_CORE_FT
POST-OP DIAGNOSIS:  Disp fracture of medial cuneiform of left foot with routine healing 07-Oct-2022 06:53:16  Jerrod Banks

## 2022-10-07 NOTE — ASU DISCHARGE PLAN (ADULT/PEDIATRIC) - CARE PROVIDER_API CALL
Tushar Lopez)  Orthopaedic Surgery  [address] [place] [address]  Phone: [phone]  Fax: [phone]  Follow Up Time:

## 2022-10-07 NOTE — ASU DISCHARGE PLAN (ADULT/PEDIATRIC) - ASU DC SPECIAL INSTRUCTIONSFT
May walk with crutches and NO WEIGHT on left foot.   Elevate left foot on blankets above heart level (toes above the nose) for 1 hour 3 times a day.  Apply ice pack to left foot for 30 minutes every 3 hours daily.  Keep bandage & splint clean and dry daily.  Cover left foot in shower daily with plastic bag & tape ( or cast bag ) to keep dry.  See the Surgeon in the office in 10 days.  Call the Surgeon for fever, fall or injury to operative leg, or severe ankle/calf pain.     Follow all verbal and written instructions. Take medications as prescribed. DO NOT drive, operate machinery, and/or make important decisions while on prescription pain medication. DO NOT hesitate to call Doctor's office with questions or concerns.    - Call your doctor if you experience:  • An increase in pain not controlled by pain medication or change in activity or  position.  • Temperature greater than 101° F.  • Redness, increased swelling or foul smelling drainage from or around the  incision.  • Numbness, tingling or a change in color or temperature of the operative extremity.  • Call your doctor immediately if you experience chest pain, shortness of breath or calf pain.     Pain medicine has been prescribed for you, as needed, and it often causes constipation.  Take docusate sodium (Colace) 100mg 3x daily, while taking narcotic pain medication.   For Constipation :   • Increase your water intake. Drink at least 8 glasses of water daily.  • Try adding fiber to your diet by eating fruits, vegetables and foods that are rich in grains.  • If you do experience constipation, you may take an over-the-counter laxative such as Senokot, Miralax or  Milk of Magnesia. May walk with weight bearing as tolerated in post op shoe on left foot.   Elevate left foot on blankets above heart level (toes above the nose) for 1 hour 3 times a day.  Apply ice pack to left foot for 30 minutes every 3 hours daily.  Keep bandage clean and dry daily, may remove the dressing in 72 hours.  Cover left foot in shower daily with plastic bag & tape ( or cast bag ) to keep dry.  See the Surgeon in the office in 10 days.  Call the Surgeon for fever, fall or injury to operative leg, or severe ankle/calf pain.     Follow all verbal and written instructions. Take medications as prescribed. DO NOT drive, operate machinery, and/or make important decisions while on prescription pain medication. DO NOT hesitate to call Doctor's office with questions or concerns.    - Call your doctor if you experience:  • An increase in pain not controlled by pain medication or change in activity or  position.  • Temperature greater than 101° F.  • Redness, increased swelling or foul smelling drainage from or around the  incision.  • Numbness, tingling or a change in color or temperature of the operative extremity.  • Call your doctor immediately if you experience chest pain, shortness of breath or calf pain.     Pain medicine has been prescribed for you, as needed, and it often causes constipation.  Take docusate sodium (Colace) 100mg 3x daily, while taking narcotic pain medication.   For Constipation :   • Increase your water intake. Drink at least 8 glasses of water daily.  • Try adding fiber to your diet by eating fruits, vegetables and foods that are rich in grains.  • If you do experience constipation, you may take an over-the-counter laxative such as Senokot, Miralax or  Milk of Magnesia.

## 2022-10-07 NOTE — PHYSICAL THERAPY INITIAL EVALUATION ADULT - GENERAL OBSERVATIONS, REHAB EVAL
"[facility]  [address]  [facility] [id number] [phone]      Patient Name:  Trish Schroeder.  :  1960      Date of Visit: 1/3/2018      Reason for Visit:  POD #8    HPI:  Trish Schroeder is a 57 y.o. female s/p LSG/HHR by  on 17    Discharged on POD#1 with no concerns.     Doing well. She does have some tightness of chest/ rib pressure that is improving daily, has had a lot of \"gas pains\", also improving.  Denies cough, SOA, fevers. No other issues/concerns. Has not needed antiemetics or pain meds. Denies dysphagia, reflux, nausea, vomiting, abdominal pain, pulmonary issues and fevers.  Tolerating diet progression - on stage 2 since day 4. Has been eating cottage cheese, peanut butter. She is \"over\" the protein shakes, does not like the taste, needs real food.  Getting 50g max prot/day.  Drinking 32 fluid oz/day max.  Taking MVI.  On Omeprazole .  Holding ASA , NSAIDs , Tramadol, Hormones, Diuretics , Steroids and Immunologics.  Ambulating regularly.     Presurgery weight: 256 pounds.  Today's weight is 111 kg (244 lb 8 oz) pounds, today's  Body mass index is 38.29 kg/(m^2)., and her weight loss since surgery is 12 pounds.       Past Medical History:   Diagnosis Date   • Anxiousness    • Arthritis     Rheumatoid eval pending.  Saw rhematologist, still exact dx unclear - RH vs OA. Pain top of right foot is her only sx   • Degenerative spinal arthritis     on preop CXR   • Depression     after divorce   • Fatigue    • HLD (hyperlipidemia)    • Joint pain    • Morbid obesity    •  (normal spontaneous vaginal delivery)     x 2   • Vitamin D deficiency    • Wears glasses      Past Surgical History:   Procedure Laterality Date   • APPENDECTOMY OPEN      complicated by postop infection   • COLONOSCOPY      unremarkable   • ENDOSCOPY N/A 2017    Procedure: ESOPHAGOGASTRODUODENOSCOPY;  Surgeon: El Mei MD;  Location: " " [facility];  Service:    • GASTRIC SLEEVE LAPAROSCOPIC N/A 12/26/2017    Procedure: GASTRIC SLEEVE LAPAROSCOPIC;  Surgeon: El Mei MD;  Location:  [facility];  Service:    • HEMORRHOIDECTOMY  1997   • LAPAROSCOPIC CHOLECYSTECTOMY  2015    dz, no stones, @ [facility]   • NASAL SEPTAL RECONSTRUCTION  2006   • OTHER SURGICAL HISTORY      denies anesthesia issues   • CT LAP,ESOPHAGOGAST FUNDOPLASTY N/A 12/26/2017    Procedure: HIATAL HERNIA REPAIR LAPAROSCOPIC;  Surgeon: El Mei MD;  Location:  [facility];  Service: Bariatric     No outpatient prescriptions have been marked as taking for the 1/3/18 encounter (Office Visit) with Pallavi Manley PA-C.     No Known Allergies    Social History     Social History   • Marital status:      Spouse name: N/A   • Number of children: N/A   • Years of education: N/A     Occupational History   • Marketing      Social History Main Topics   • Smoking status: Former Smoker     Packs/day: 2.00     Years: 15.00     Types: Cigarettes     Quit date: 1991   • Smokeless tobacco: Never Used   • Alcohol use Yes      Comment: 2-3 times per week (wine)   • Drug use: No   • Sexual activity: Defer     Other Topics Concern   • Not on file     Social History Narrative    Lives in [place] - alone.        /78 (BP Location: Left arm, Patient Position: Sitting, Cuff Size: Large Adult)  Pulse 69  Temp 97.9 °F (36.6 °C) (Temporal Artery )   Resp 18  Ht 170.2 cm (67\")  Wt 111 kg (244 lb 8 oz)  SpO2 99%  BMI 38.29 kg/m2  Physical Exam   Constitutional: She appears well-developed and well-nourished.   HENT:   Head: Normocephalic and atraumatic.   Cardiovascular: Normal rate and regular rhythm.    Pulmonary/Chest: Effort normal and breath sounds normal.   Abdominal: Soft. Bowel sounds are normal.   Incisions healing well   Neurological: She is alert.   Skin: Skin is warm and dry.   Psychiatric: She has a normal mood and affect. Her behavior is normal. "         Assessment:   POD #8 s/p LSG/HHR by  on 12/26/17    ICD-10-CM ICD-9-CM   1. Status post bariatric surgery Z98.84 V45.86   2. Obesity, Class II, BMI 35-39.9 E66.9 278.00         Plan:  Doing well.  Chest discomfort improving, likely post surgical HHR. Call if symptoms worsen, change. Continue to advance diet per manual. Cautioned against advancing diet too rapidly.  At length discussion regarding increasing protein intake to 70-100g/day.  Gave suggestions for other protein supplement, powders, becerril. Increase fluids to 64oz/ day.  Increase exercise/activity as tolerated.  Reviewed lifting restrictions, nothing >25 lbs x 2 more weeks.  Start full regimen of vitamins per manual.  Continue PPI daily.  Continue to avoid ASA/NSAIDs/Steroids/tobacco x 6 weeks postop.  Call w/ problems/concerns.    The patient was instructed to follow up in 3 weeks, sooner if needed.    pt received in PACU sitting OOB, + post op shoe left foot.

## 2022-10-07 NOTE — ASU PATIENT PROFILE, ADULT - AS SC BRADEN MOISTURE
no change in bowel habits/no nausea/no vomiting/no constipation/no flatulence/no abdominal pain/no diarrhea (4) rarely moist

## 2022-10-18 ENCOUNTER — APPOINTMENT (OUTPATIENT)
Dept: ORTHOPEDIC SURGERY | Facility: CLINIC | Age: 22
End: 2022-10-18

## 2022-10-18 VITALS — HEIGHT: 60 IN | WEIGHT: 133 LBS | BODY MASS INDEX: 26.11 KG/M2

## 2022-10-18 VITALS — WEIGHT: 133 LBS | BODY MASS INDEX: 26.11 KG/M2 | HEIGHT: 60 IN

## 2022-10-18 DIAGNOSIS — Z78.9 OTHER SPECIFIED HEALTH STATUS: ICD-10-CM

## 2022-10-18 PROCEDURE — 99024 POSTOP FOLLOW-UP VISIT: CPT

## 2022-10-18 PROCEDURE — 73630 X-RAY EXAM OF FOOT: CPT | Mod: LT
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
## 2022-10-18 NOTE — REVIEW OF SYSTEMS
[Joint Pain] : no joint pain [Joint Stiffness] : no joint stiffness [Joint Swelling] : joint swelling

## 2022-10-18 NOTE — PHYSICAL EXAM
[Left] : left foot and ankle [NL (20)] : dorsiflexion 20 degrees [NL (40)] : plantar flexion 40 degrees [5___] : eversion 5[unfilled]/5 [2+] : dorsalis pedis pulse: 2+ [] : patient ambulates without assistive device [FreeTextEntry8] : no ttp

## 2022-11-14 ENCOUNTER — APPOINTMENT (OUTPATIENT)
Dept: ORTHOPEDIC SURGERY | Facility: CLINIC | Age: 22
End: 2022-11-14

## 2022-11-17 ENCOUNTER — APPOINTMENT (OUTPATIENT)
Dept: ORTHOPEDIC SURGERY | Facility: CLINIC | Age: 22
End: 2022-11-17

## 2022-11-17 PROCEDURE — 73630 X-RAY EXAM OF FOOT: CPT | Mod: LT

## 2022-11-17 PROCEDURE — 99024 POSTOP FOLLOW-UP VISIT: CPT

## 2022-11-17 NOTE — PHYSICAL EXAM
[Left] : left foot and ankle [NL (40)] : plantar flexion 40 degrees [5___] : eversion 5[unfilled]/5 [2+] : dorsalis pedis pulse: 2+ [] : no calf tenderness [NL 30)] : inversion 30 degrees [NL (20)] : eversion 20 degrees [FreeTextEntry8] : no ttp
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
## 2022-11-25 ENCOUNTER — NON-APPOINTMENT (OUTPATIENT)
Age: 22
End: 2022-11-25

## 2023-02-26 NOTE — ASU DISCHARGE PLAN (ADULT/PEDIATRIC) - A. DRIVE A CAR, OPERATE POWER TOOLS OR MACHINERY
[facility] Hospitalists Progress Note       Date Of Service: 02/26/23    Subjective:   No acute complaints.  BM earlier, appetite is fine.  Inquires about facility he will be going to.     Medical Decision Making:   Disposition:  Patient with multiple morbidities, continue with hospitalization.    Discussion and reviews:  Notes from last 24hrs reviewed and appreciated   Results of lab work and imaging studies from last 24hrs independently reviewed  Work up to continue as per orders and plan of care  Plan of care discussed with consultants as documented.    Today's updates and plan of care changes:  2/26:Likely discharge to SNF in am.  BP noted, reached out to Nephrology regarding poorly controlled BP.  C/w present diet but switch to low salt.  2/25:Await SNF.  Possible discharge Monday.   ADDENDUM:Repeat BP: 152/75.  Improved but still elevated.  Will adjust meds and routine labs in am.  2/24:Await SNF.  2/23:Await SNF.  Needs better BP control, monitor for now.  2/22:[facility]  Per charting bed unavailable.  Await SNF.  Medically stable for discharge.  ADDENDUM:BP not well controlled.  Increase hydralazine and will monitor.  C/w other BP meds.  2/21:PT/OT recommending rehab.  Per Vascular surgery no surgical intervention recommended.  BP needs better control. Meds noted , will monitor. Cr noted, defer to Nephrology.dd lidoderm patch to R shoulder.  Mr. Lewis is a 78 y/o male with PMH hypertension, hyperlipidemia, CVA s/p thrombectomy at [facility] December 2022, asthma, and prostate cancer who p/w stroke-like symptoms. Stroke alert imaging negative for LVO or acute hemorrhage. Patient to be admitted for secondary evaluation by neurology team.    #Slurred speech and L facial droop  #H/o CVA s/p thrombectomy December 2022 at [facility]  -CT head 2/13: no acute hemorrhage  -CT angio head and neck 2/13: moderate L ICA  stenosis, mild R ICA stenosis, no LVO, stenotic changes to intracranial internal carotids, right distal M1 segment, left PICA origin, and proximal basilar artery  -CT perfusion 2/13: area of perfusion with superimpsed smaller area of core infarct in the left MCA territory.  -A1c pending  -lipid panel reviewed  -neuro consult, Dr. Kam, appreciate recs  -possible new CVA versus recurrence of stroke symptoms 2/2 underlying metabolic etiology  -continue aspirin and clopidogrel. Start on statin      #Right shoulder pain  #Multiple falls  #Physical Deconditioning  -multiple recent falls onto that side  - XR right shoulder shows no fracture/dislocation   -PT/OT evals     #Moderate Left Carotid Stenosis  -  US carotid consistent with Borderline (50-69%) hemodynamically significantly stenoses, proximal bilateral external carotid arteries  - Will need to follow up with vascular surgery as out patient     #Hypertension  -continue home labetalol, lisinopril, furosemide, and nifidepine  -monitor pressures     #Hyperlipidemia  -lipid panel tomorrow AM  -continue home rosuvastatin     #Asthma without acute exacerbation  -continue home Flovent with PRN DuoNebs     #Leukocytosis  -likely reactive  -UA negative  -downtrending      #Anemia  -stable when compared to previous values  -trend CBC     # Constipation  #Anorexia  - On scheduled Miralax and senna  - Ensure appetite improves after BM     #H/o prostate cancer: Continue outpatient surveillance  #CKD stage 2 to 3a: Stable near baseline  #Morbid Obesity:  on diet and exercise with goal of weight loss                                   Patient Active Problem List   Diagnosis   • Acute CVA (cerebrovascular accident) (CMS/HCC)   • Stroke-like symptoms       DVT ppx:   heparin (porcine) - 5000 UNIT/ML     Recent vitals:  Visit Vitals  BP (!) 164/81   Pulse 66   Temp 99 °F (37.2 °C) (Oral)   Resp 18   Ht 6' 2\" (1.88 m)   Wt (!) 138.9 kg (306 lb 3.5 oz)   SpO2 99%   BMI 39.32  kg/m²       Labs   Recent Labs   Lab 02/26/23  0603 02/25/23  0922 02/24/23  0616 02/21/23  1011 02/20/23  0918   SODIUM 142 142 142   < > 141   POTASSIUM 3.8 3.9 3.7   < > 4.0   CHLORIDE 107 108 108   < > 105   CO2 26 26 26   < > 26   BUN 22* 23* 25*   < > 26*   CREATININE 1.50* 1.56* 1.64*   < > 1.57*   GLUCOSE 100* 117* 101*   < > 109*   CALCIUM 8.9 9.2 9.0   < > 9.3   ALBUMIN  --   --   --   --  3.1*   AST  --   --   --   --  33    < > = values in this interval not displayed.     Recent Labs   Lab 02/26/23  0603 02/25/23  0922 02/24/23  0616   WBC 14.0* 13.3* 12.9*   HGB 11.6* 11.9* 11.7*   HCT 35.4* 36.9* 35.7*    304 287     No results found   No results available in last 24 hours     Cultures  Microbiology Results     None           Objective:   Head and neck:atraumatic/nc  Lungs: There is no wheezing or crackles.   Heart: no M.   Abd: Soft, nontender, non distended.  Obese.  Skin: Warm and dry.  Neuro: alert and responds appropriately. L facial droop.   Psych:appropriate affect and mood    Current Medications:  Current Facility-Administered Medications   Medication Dose Route Frequency Provider Last Rate Last Admin   • hydrALAZINE (APRESOLINE) tablet 100 mg  100 mg Oral 3 times per day Tiarra Jaimes CNP   100 mg at 02/26/23 0602   • [Held by provider] furosemide (LASIX) tablet 40 mg  40 mg Oral BID Luisana Cantu MD   40 mg at 02/21/23 0858   • guaiFENesin-DM (MUCINEX DM) 600-30 mg ER tablet 2 tablet  2 tablet Oral 2 times per day Luisana Cantu MD   2 tablet at 02/26/23 0833   • labetalol (NORMODYNE) tablet 400 mg  400 mg Oral BID EMILIO Grayson   400 mg at 02/26/23 0833   • polyethylene glycol (MIRALAX) packet 17 g  17 g Oral BID Daphne Helm MD   17 g at 02/26/23 0833   • senna (SENOKOT) 17.2 mg  2 tablet Oral Nightly Daphne Helm MD   17.2 mg at 02/25/23 2035   • sodium chloride (PF) 0.9 % injection 2 mL  2 mL Intracatheter 2 times per day Tiarra Jaimes CNP   2 mL at  02/26/23 0837   • aspirin chewable 81 mg  81 mg Oral Daily Dinorah Kam MD   81 mg at 02/26/23 0833   • clopidogrel (PLAVIX) tablet 75 mg  75 mg Oral Daily Dinorah Kam MD   75 mg at 02/26/23 0832   • NIFEdipine XL (PROCARDIA XL) ER tablet 120 mg  120 mg Oral Daily Tiarra Jaimes CNP   120 mg at 02/26/23 0832   • rosuvastatin (CRESTOR) tablet 40 mg  40 mg Oral Daily Tiarra Jaimes CNP   40 mg at 02/26/23 0833   • fluticasone propionate (FLOVENT HFA) 110 MCG/ACT inhaler 4 puff  4 puff Inhalation BID Resp Tiarra Jaimes CNP   4 puff at 02/26/23 0845   • Potassium Standard Replacement Protocol (Levels 3.5 and lower)   Does not apply See Admin Instructions Tiarra Jaimes CNP       • Magnesium Standard Replacement Protocol   Does not apply See Admin Instructions Tiarra Jaimes CNP       • heparin (porcine) injection 5,000 Units  5,000 Units Subcutaneous 3 times per day Tiarra Jaimes CNP   5,000 Units at 02/26/23 0602   • lisinopril (ZESTRIL) tablet 40 mg  40 mg Oral Daily Tiarra Jaimes CNP   40 mg at 02/26/23 0832   • lidocaine (LIDOCARE) 4 % patch 1 patch  1 patch Transdermal Daily Ghada Tinsley PA-C   1 patch at 02/26/23 0834       Continuous Infusions:  Current Facility-Administered Medications   Medication Dose Route Frequency Provider Last Rate Last Admin       PRN Meds:.  Current Facility-Administered Medications   Medication Dose Route Frequency   • hydrALAZINE (APRESOLINE) injection 10 mg  10 mg Intravenous Q4H PRN   • HYDROcodone-acetaminophen (NORCO) 5-325 MG per tablet 1 tablet  1 tablet Oral Q4H PRN   • ipratropium-albuterol (DUONEB) 0.5-2.5 (3) MG/3ML nebulizer solution 3 mL  3 mL Nebulization Q4H Resp PRN   • sodium chloride 0.9 % flush bag 25 mL  25 mL Intravenous PRN   • sodium chloride (NORMAL SALINE) 0.9 % bolus 500 mL  500 mL Intravenous PRN   • sodium chloride 0.9 % flush bag 25 mL  25 mL Intravenous PRN   • polyethylene glycol (MIRALAX) packet 17 g  17 g Oral Daily PRN   •  acetaminophen (TYLENOL) tablet 650 mg  650 mg Oral Q4H PRN            Code Status    Code Status: Full Resuscitation      I spent greater than 45 minutes coordinating care, reviewing patient's diagnostic studies, examining patient, reviewing pertinent notes, collaborating with RNs/physicians/APNs/PAs involved in patient's care, determining management plan, and discussing plan with patient at bedside.       This note was created using voice recognition technology. It may include inadvertent transcriptional errors. Any such errors should be contextually interpreted and should not be taken to alter the content or the meaning.   Note to Patient: The 21st Century Cures Act makes medical notes like these available to patients in the interest of transparency. However, be advised this is a medical document. It is intended as peer to peer communication. It is written in medical language and may contain abbreviations or verbiage that are unfamiliar. It may appear blunt or direct. Medical documents are intended to carry relevant information, facts as evident, and the clinical opinion of the practitioner.    Statement Selected
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
## 2023-06-27 NOTE — ASU PATIENT PROFILE, ADULT - ABILITY TO HEAR (WITH HEARING AID OR HEARING APPLIANCE IF NORMALLY USED):
Adequate: hears normal conversation without difficulty PAST SURGICAL HISTORY:  S/P laparoscopy for heavy menstrual bleeding    S/P Tonsillectomy

## 2023-07-12 ENCOUNTER — APPOINTMENT (OUTPATIENT)
Dept: INTERNAL MEDICINE | Facility: CLINIC | Age: 23
End: 2023-07-12

## 2023-08-22 NOTE — ASU DISCHARGE PLAN (ADULT/PEDIATRIC) - C. MAKE IMPORTANT PERSONAL OR BUSINESS DECISIONS
Head, normocephalic, atraumatic, Face, Face within normal limits, External ears within normal limits, External nose  normal appearance Statement Selected

## 2023-10-27 ENCOUNTER — APPOINTMENT (OUTPATIENT)
Dept: ORTHOPEDIC SURGERY | Facility: CLINIC | Age: 23
End: 2023-10-27
Payer: COMMERCIAL

## 2023-10-27 VITALS — WEIGHT: 133 LBS | HEIGHT: 60 IN | BODY MASS INDEX: 26.11 KG/M2

## 2023-10-27 DIAGNOSIS — M22.2X1 PATELLOFEMORAL DISORDERS, RIGHT KNEE: ICD-10-CM

## 2023-10-27 DIAGNOSIS — M23.92 UNSPECIFIED INTERNAL DERANGEMENT OF LEFT KNEE: ICD-10-CM

## 2023-10-27 DIAGNOSIS — M22.2X2 PATELLOFEMORAL DISORDERS, LEFT KNEE: ICD-10-CM

## 2023-10-27 DIAGNOSIS — M23.91 UNSPECIFIED INTERNAL DERANGEMENT OF RIGHT KNEE: ICD-10-CM

## 2023-10-27 PROCEDURE — 73564 X-RAY EXAM KNEE 4 OR MORE: CPT | Mod: 50

## 2023-10-27 PROCEDURE — 99214 OFFICE O/P EST MOD 30 MIN: CPT

## 2023-12-17 ENCOUNTER — NON-APPOINTMENT (OUTPATIENT)
Age: 23
End: 2023-12-17

## 2023-12-21 ENCOUNTER — APPOINTMENT (OUTPATIENT)
Dept: ORTHOPEDIC SURGERY | Facility: CLINIC | Age: 23
End: 2023-12-21

## 2023-12-22 ENCOUNTER — APPOINTMENT (OUTPATIENT)
Dept: ORTHOPEDIC SURGERY | Facility: CLINIC | Age: 23
End: 2023-12-22
Payer: COMMERCIAL

## 2023-12-22 VITALS — WEIGHT: 133 LBS | BODY MASS INDEX: 26.11 KG/M2 | HEIGHT: 60 IN

## 2023-12-22 DIAGNOSIS — M79.672 PAIN IN LEFT FOOT: ICD-10-CM

## 2023-12-22 PROCEDURE — 99213 OFFICE O/P EST LOW 20 MIN: CPT

## 2023-12-22 PROCEDURE — 73630 X-RAY EXAM OF FOOT: CPT | Mod: LT

## 2023-12-22 NOTE — REASON FOR VISIT
[FreeTextEntry2] : LT Foot pain  Detail Level: Simple Initiate Treatment: Wait 1 week then start applying Retin A Micro for 1 week , then apply Carac cream x 3 days.

## 2023-12-22 NOTE — PHYSICAL EXAM
[Left] : left foot and ankle [1st] : 1st [2nd] : 2nd [] : full range of motion of foot [5___] : plantar flexion 5[unfilled]/5

## 2023-12-22 NOTE — HISTORY OF PRESENT ILLNESS
[7] : 7 [0] : 0 [Dull/Aching] : dull/aching [Sharp] : sharp [Intermittent] : intermittent [Nothing helps with pain getting better] : Nothing helps with pain getting better [de-identified] : 23 yr old F Pt C/O Pain when walking. Denies injury. denies N/T. No pain when resting. Pt has not tried any OTC medications. Had left foot surgery last year by Dr. Lopez [] : no [FreeTextEntry1] : LT Foot

## 2023-12-24 ENCOUNTER — APPOINTMENT (OUTPATIENT)
Dept: MRI IMAGING | Facility: CLINIC | Age: 23
End: 2023-12-24
Payer: COMMERCIAL

## 2023-12-24 PROCEDURE — 73718 MRI LOWER EXTREMITY W/O DYE: CPT | Mod: 1L,LT

## 2023-12-28 ENCOUNTER — APPOINTMENT (OUTPATIENT)
Dept: ORTHOPEDIC SURGERY | Facility: CLINIC | Age: 23
End: 2023-12-28
Payer: COMMERCIAL

## 2023-12-28 DIAGNOSIS — S92.242D DISPLACED FRACTURE OF MEDIAL CUNEIFORM OF LEFT FOOT, SUBSEQUENT ENCOUNTER FOR FRACTURE WITH ROUTINE HEALING: ICD-10-CM

## 2023-12-28 PROCEDURE — 99214 OFFICE O/P EST MOD 30 MIN: CPT

## 2023-12-28 NOTE — PHYSICAL EXAM
[Left] : left foot and ankle [NL (40)] : plantar flexion 40 degrees [NL 30)] : inversion 30 degrees [NL (20)] : eversion 20 degrees [5___] : eversion 5[unfilled]/5 [2+] : dorsalis pedis pulse: 2+ [] : no pain on mid-foot stress [FreeTextEntry8] : no lisfranc ttp mid med foot ttp
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
## 2023-12-28 NOTE — DATA REVIEWED
[Outside X-rays] : outside x-rays [MRI] : MRI [Left] : left [Foot] : foot [I reviewed the films/CD and additionally noted] : I reviewed the films/CD and additionally noted [FreeTextEntry1] : no acute fx [FreeTextEntry2] : healed lisfranc lig; no acute fx

## 2024-01-08 ENCOUNTER — NON-APPOINTMENT (OUTPATIENT)
Age: 24
End: 2024-01-08

## 2024-01-10 ENCOUNTER — NON-APPOINTMENT (OUTPATIENT)
Age: 24
End: 2024-01-10

## 2024-01-10 NOTE — ED ADULT NURSE NOTE - NSFALLRSKASSESASSIST_ED_ALL_ED
Show Applicator Variable?: Yes
Detail Level: Detailed
Post-Care Instructions: I reviewed with the patient in detail post-care instructions. Patient is to wear sunprotection, and avoid picking at any of the treated lesions. Pt may apply Vaseline to crusted or scabbing areas.
Application Tool (Optional): Cry-AC
Duration Of Freeze Thaw-Cycle (Seconds): 10
Show Aperture Variable?: No
Consent: The patient's consent was obtained including but not limited to risks of crusting, scabbing, blistering, scarring, darker or lighter pigmentary change, recurrence, incomplete removal and infection.
Number Of Freeze-Thaw Cycles: 1 freeze-thaw cycle
no

## 2024-01-11 ENCOUNTER — TRANSCRIPTION ENCOUNTER (OUTPATIENT)
Age: 24
End: 2024-01-11

## 2024-01-18 ENCOUNTER — APPOINTMENT (OUTPATIENT)
Dept: ORTHOPEDIC SURGERY | Facility: CLINIC | Age: 24
End: 2024-01-18

## 2024-04-26 ENCOUNTER — APPOINTMENT (OUTPATIENT)
Dept: MATERNAL FETAL MEDICINE | Facility: CLINIC | Age: 24
End: 2024-04-26

## 2024-04-26 ENCOUNTER — APPOINTMENT (OUTPATIENT)
Dept: ANTEPARTUM | Facility: CLINIC | Age: 24
End: 2024-04-26
Payer: COMMERCIAL

## 2024-04-26 ENCOUNTER — ASOB RESULT (OUTPATIENT)
Age: 24
End: 2024-04-26

## 2024-04-26 PROCEDURE — 76801 OB US < 14 WKS SINGLE FETUS: CPT

## 2024-04-30 ENCOUNTER — APPOINTMENT (OUTPATIENT)
Dept: OBGYN | Facility: CLINIC | Age: 24
End: 2024-04-30
Payer: COMMERCIAL

## 2024-04-30 ENCOUNTER — TRANSCRIPTION ENCOUNTER (OUTPATIENT)
Age: 24
End: 2024-04-30

## 2024-04-30 ENCOUNTER — OUTPATIENT (OUTPATIENT)
Dept: OUTPATIENT SERVICES | Facility: HOSPITAL | Age: 24
LOS: 1 days | End: 2024-04-30
Payer: COMMERCIAL

## 2024-04-30 VITALS
TEMPERATURE: 98 F | OXYGEN SATURATION: 98 % | HEART RATE: 76 BPM | SYSTOLIC BLOOD PRESSURE: 106 MMHG | HEIGHT: 60 IN | RESPIRATION RATE: 16 BRPM | DIASTOLIC BLOOD PRESSURE: 69 MMHG | WEIGHT: 130.95 LBS

## 2024-04-30 VITALS — WEIGHT: 134.4 LBS | DIASTOLIC BLOOD PRESSURE: 78 MMHG | SYSTOLIC BLOOD PRESSURE: 122 MMHG

## 2024-04-30 DIAGNOSIS — Z98.890 OTHER SPECIFIED POSTPROCEDURAL STATES: Chronic | ICD-10-CM

## 2024-04-30 DIAGNOSIS — O03.9 COMPLETE OR UNSPECIFIED SPONTANEOUS ABORTION WITHOUT COMPLICATION: ICD-10-CM

## 2024-04-30 DIAGNOSIS — Z33.2 ENCOUNTER FOR ELECTIVE TERMINATION OF PREGNANCY: ICD-10-CM

## 2024-04-30 DIAGNOSIS — Z01.818 ENCOUNTER FOR OTHER PREPROCEDURAL EXAMINATION: ICD-10-CM

## 2024-04-30 LAB
ANION GAP SERPL CALC-SCNC: 14 MMOL/L — SIGNIFICANT CHANGE UP (ref 5–17)
APTT BLD: 28 SEC — SIGNIFICANT CHANGE UP (ref 24.5–35.6)
BLD GP AB SCN SERPL QL: NEGATIVE — SIGNIFICANT CHANGE UP
BUN SERPL-MCNC: 9 MG/DL — SIGNIFICANT CHANGE UP (ref 7–23)
CALCIUM SERPL-MCNC: 9.1 MG/DL — SIGNIFICANT CHANGE UP (ref 8.4–10.5)
CHLORIDE SERPL-SCNC: 103 MMOL/L — SIGNIFICANT CHANGE UP (ref 96–108)
CO2 SERPL-SCNC: 21 MMOL/L — LOW (ref 22–31)
CREAT SERPL-MCNC: 0.59 MG/DL — SIGNIFICANT CHANGE UP (ref 0.5–1.3)
EGFR: 131 ML/MIN/1.73M2 — SIGNIFICANT CHANGE UP
FIBRINOGEN PPP-MCNC: 276 MG/DL — SIGNIFICANT CHANGE UP (ref 200–445)
GLUCOSE SERPL-MCNC: 109 MG/DL — HIGH (ref 70–99)
HCT VFR BLD CALC: 34.9 % — SIGNIFICANT CHANGE UP (ref 34.5–45)
HGB BLD-MCNC: 12 G/DL — SIGNIFICANT CHANGE UP (ref 11.5–15.5)
INR BLD: 1 RATIO — SIGNIFICANT CHANGE UP (ref 0.85–1.18)
MCHC RBC-ENTMCNC: 33.4 PG — SIGNIFICANT CHANGE UP (ref 27–34)
MCHC RBC-ENTMCNC: 34.4 GM/DL — SIGNIFICANT CHANGE UP (ref 32–36)
MCV RBC AUTO: 97.2 FL — SIGNIFICANT CHANGE UP (ref 80–100)
NRBC # BLD: 0 /100 WBCS — SIGNIFICANT CHANGE UP (ref 0–0)
PLATELET # BLD AUTO: 217 K/UL — SIGNIFICANT CHANGE UP (ref 150–400)
POTASSIUM SERPL-MCNC: 4 MMOL/L — SIGNIFICANT CHANGE UP (ref 3.5–5.3)
POTASSIUM SERPL-SCNC: 4 MMOL/L — SIGNIFICANT CHANGE UP (ref 3.5–5.3)
PROTHROM AB SERPL-ACNC: 10.5 SEC — SIGNIFICANT CHANGE UP (ref 9.5–13)
RBC # BLD: 3.59 M/UL — LOW (ref 3.8–5.2)
RBC # FLD: 12.1 % — SIGNIFICANT CHANGE UP (ref 10.3–14.5)
RH IG SCN BLD-IMP: POSITIVE — SIGNIFICANT CHANGE UP
SODIUM SERPL-SCNC: 138 MMOL/L — SIGNIFICANT CHANGE UP (ref 135–145)
WBC # BLD: 4.27 K/UL — SIGNIFICANT CHANGE UP (ref 3.8–10.5)
WBC # FLD AUTO: 4.27 K/UL — SIGNIFICANT CHANGE UP (ref 3.8–10.5)

## 2024-04-30 PROCEDURE — 87591 N.GONORRHOEAE DNA AMP PROB: CPT

## 2024-04-30 PROCEDURE — 85027 COMPLETE CBC AUTOMATED: CPT

## 2024-04-30 PROCEDURE — 85384 FIBRINOGEN ACTIVITY: CPT

## 2024-04-30 PROCEDURE — 86850 RBC ANTIBODY SCREEN: CPT

## 2024-04-30 PROCEDURE — 87491 CHLMYD TRACH DNA AMP PROBE: CPT

## 2024-04-30 PROCEDURE — 85730 THROMBOPLASTIN TIME PARTIAL: CPT

## 2024-04-30 PROCEDURE — 85610 PROTHROMBIN TIME: CPT

## 2024-04-30 PROCEDURE — 99214 OFFICE O/P EST MOD 30 MIN: CPT | Mod: 25

## 2024-04-30 PROCEDURE — 80048 BASIC METABOLIC PNL TOTAL CA: CPT

## 2024-04-30 PROCEDURE — 76815 OB US LIMITED FETUS(S): CPT | Mod: 26

## 2024-04-30 PROCEDURE — 86901 BLOOD TYPING SEROLOGIC RH(D): CPT

## 2024-04-30 PROCEDURE — 87086 URINE CULTURE/COLONY COUNT: CPT

## 2024-04-30 PROCEDURE — 86900 BLOOD TYPING SEROLOGIC ABO: CPT

## 2024-04-30 PROCEDURE — G0463: CPT

## 2024-04-30 RX ORDER — LIDOCAINE HCL 20 MG/ML
0.2 VIAL (ML) INJECTION ONCE
Refills: 0 | Status: DISCONTINUED | OUTPATIENT
Start: 2024-05-01 | End: 2024-05-15

## 2024-04-30 RX ORDER — SODIUM CHLORIDE 9 MG/ML
1000 INJECTION, SOLUTION INTRAVENOUS
Refills: 0 | Status: DISCONTINUED | OUTPATIENT
Start: 2024-05-01 | End: 2024-05-15

## 2024-04-30 NOTE — H&P PST ADULT - NSICDXPASTSURGICALHX_GEN_ALL_CORE_FT
PAST SURGICAL HISTORY:  History of D&C     S/P ORIF (open reduction internal fixation) fracture 6/2022

## 2024-04-30 NOTE — H&P PST ADULT - PROBLEM SELECTOR PLAN 1
Pt is scheduled for a D&C for missed  with Dr. Murguia on 24 at the ambulatory care center  CBC, BMP, T/S, Coags ordered and obtained at [facility]  ABO on admit

## 2024-04-30 NOTE — H&P PST ADULT - ASSESSMENT
DASI score: 9.89  DASI activity: Able to walk 2-3 blocks, ADLs, Grocery Shopping, 1 Flight of Stairs, works as a , goes to the gym 4-5x per week x 2 hours (weight-lifting, stairmaster x 15 minutes, incline walking on treadmill x 20 minutes)  Loose teeth or denture: denies

## 2024-04-30 NOTE — H&P PST ADULT - NSICDXPASTMEDICALHX_GEN_ALL_CORE_FT
PAST MEDICAL HISTORY:  Complete or unspecified spontaneous  without complication     COVID-2021, cold like symptoms and lost taste and smeel which have returned    Displaced fracture of medial cuneiform of left foot     Missed

## 2024-04-30 NOTE — H&P PST ADULT - HISTORY OF PRESENT ILLNESS
23 year old female  at 15w2d by LMP (24) measuring 8w2d on U/S as obtained by MFM on 24 with NIPS testing concerned for triploidy. Pt denies any vaginal bleeding or cramping. She denies any pregnancy symptoms. She went for a subsequent ultrasound and no fetal heart beat was detected. Pt now presents to PST for scheduled D&C for missed  with Dr. Murguia on 24 at the ambulatory care center.?

## 2024-05-01 ENCOUNTER — OUTPATIENT (OUTPATIENT)
Dept: OUTPATIENT SERVICES | Facility: HOSPITAL | Age: 24
LOS: 1 days | End: 2024-05-01
Payer: COMMERCIAL

## 2024-05-01 ENCOUNTER — APPOINTMENT (OUTPATIENT)
Dept: OBGYN | Facility: CLINIC | Age: 24
End: 2024-05-01

## 2024-05-01 ENCOUNTER — RESULT REVIEW (OUTPATIENT)
Age: 24
End: 2024-05-01

## 2024-05-01 ENCOUNTER — TRANSCRIPTION ENCOUNTER (OUTPATIENT)
Age: 24
End: 2024-05-01

## 2024-05-01 VITALS
HEART RATE: 69 BPM | WEIGHT: 130.95 LBS | HEIGHT: 60 IN | SYSTOLIC BLOOD PRESSURE: 108 MMHG | RESPIRATION RATE: 18 BRPM | OXYGEN SATURATION: 99 % | TEMPERATURE: 98 F | DIASTOLIC BLOOD PRESSURE: 72 MMHG

## 2024-05-01 VITALS
RESPIRATION RATE: 18 BRPM | SYSTOLIC BLOOD PRESSURE: 116 MMHG | TEMPERATURE: 98 F | HEART RATE: 68 BPM | DIASTOLIC BLOOD PRESSURE: 56 MMHG | OXYGEN SATURATION: 100 %

## 2024-05-01 DIAGNOSIS — Z98.890 OTHER SPECIFIED POSTPROCEDURAL STATES: Chronic | ICD-10-CM

## 2024-05-01 DIAGNOSIS — O03.9 COMPLETE OR UNSPECIFIED SPONTANEOUS ABORTION WITHOUT COMPLICATION: ICD-10-CM

## 2024-05-01 LAB
C TRACH RRNA SPEC QL NAA+PROBE: SIGNIFICANT CHANGE UP
CULTURE RESULTS: SIGNIFICANT CHANGE UP
N GONORRHOEA RRNA SPEC QL NAA+PROBE: SIGNIFICANT CHANGE UP
SPECIMEN SOURCE: SIGNIFICANT CHANGE UP
SPECIMEN SOURCE: SIGNIFICANT CHANGE UP

## 2024-05-01 PROCEDURE — 86850 RBC ANTIBODY SCREEN: CPT

## 2024-05-01 PROCEDURE — 88233 TISSUE CULTURE SKIN/BIOPSY: CPT

## 2024-05-01 PROCEDURE — 88305 TISSUE EXAM BY PATHOLOGIST: CPT

## 2024-05-01 PROCEDURE — 88305 TISSUE EXAM BY PATHOLOGIST: CPT | Mod: 26

## 2024-05-01 PROCEDURE — 59820 CARE OF MISCARRIAGE: CPT

## 2024-05-01 PROCEDURE — 86900 BLOOD TYPING SEROLOGIC ABO: CPT

## 2024-05-01 PROCEDURE — 58300 INSERT INTRAUTERINE DEVICE: CPT

## 2024-05-01 PROCEDURE — 86901 BLOOD TYPING SEROLOGIC RH(D): CPT

## 2024-05-01 PROCEDURE — 59821 TREATMENT OF MISCARRIAGE: CPT

## 2024-05-01 PROCEDURE — 88264 CHROMOSOME ANALYSIS 20-25: CPT

## 2024-05-01 PROCEDURE — 76998 US GUIDE INTRAOP: CPT | Mod: 26

## 2024-05-01 PROCEDURE — 88291 CYTO/MOLECULAR REPORT: CPT | Mod: 1L

## 2024-05-01 PROCEDURE — 88280 CHROMOSOME KARYOTYPE STUDY: CPT

## 2024-05-01 DEVICE — DVC BIRTH CTRL IUD T380A ORD MIN OF 5: Type: IMPLANTABLE DEVICE | Status: FUNCTIONAL

## 2024-05-01 RX ORDER — FENTANYL CITRATE 50 UG/ML
25 INJECTION INTRAVENOUS
Refills: 0 | Status: DISCONTINUED | OUTPATIENT
Start: 2024-05-01 | End: 2024-05-01

## 2024-05-01 RX ORDER — ONDANSETRON 8 MG/1
4 TABLET, FILM COATED ORAL ONCE
Refills: 0 | Status: DISCONTINUED | OUTPATIENT
Start: 2024-05-01 | End: 2024-05-15

## 2024-05-01 RX ADMIN — SODIUM CHLORIDE 100 MILLILITER(S): 9 INJECTION, SOLUTION INTRAVENOUS at 07:51

## 2024-05-01 NOTE — PRE-ANESTHESIA EVALUATION ADULT - NSANTHSUBSTSD_GEN_ALL_CORE
----- Message from Migue Blackman MD sent at 10/19/2021 12:47 PM CDT -----  CT abd no acute findings has arthritis fatty liver and benign small cyst in liver and kidney no f/u needed can take tylenol and apply pain cream or heat to area if not better in 1 week call back   No

## 2024-05-01 NOTE — ASU DISCHARGE PLAN (ADULT/PEDIATRIC) - CARE PROVIDER_API CALL
Gifty Murguia  Obstetrics and Gynecology  [address], [address]  [place] [address]  Phone: [phone]  Fax: [phone]  Follow Up Time:

## 2024-05-01 NOTE — ASU PATIENT PROFILE, ADULT - FALL HARM RISK - UNIVERSAL INTERVENTIONS
Bed in lowest position, wheels locked, appropriate side rails in place/Call bell, personal items and telephone in reach/Instruct patient to call for assistance before getting out of bed or chair/[place] to call system/Physically safe environment - no spills, clutter or unnecessary equipment/Purposeful Proactive Rounding/Room/bathroom lighting operational, light cord in reach

## 2024-05-01 NOTE — ASU PATIENT PROFILE, ADULT - NS TRANSFER EYEGLASSES PAIRS
Dianelys left them a message last week that I need to know where exactly in [place] and how long they will be there so please get that information so I can write the prescriptions   
LM for patient to call us back and let us know the info that is needed.  
PT called to inform MD that he and his wife are going to [facility] on 6/26. They both need RX for malaria, would like it sent to [facility].  
none

## 2024-05-01 NOTE — ASU DISCHARGE PLAN (ADULT/PEDIATRIC) - NURSING INSTRUCTIONS
OK to take Tylenol/Acetaminophen at 3'20pm  for pain and every 6 hours after as needed. OK to take Motrin/Ibuprofen at 3'25pm  for pain and every 6 hours after as needed.

## 2024-05-06 LAB — SURGICAL PATHOLOGY STUDY: SIGNIFICANT CHANGE UP

## 2024-05-10 PROBLEM — O03.9 COMPLETE OR UNSPECIFIED SPONTANEOUS ABORTION WITHOUT COMPLICATION: Chronic | Status: ACTIVE | Noted: 2024-04-30

## 2024-05-10 PROBLEM — O02.1 MISSED ABORTION: Chronic | Status: ACTIVE | Noted: 2024-04-30

## 2024-05-16 ENCOUNTER — APPOINTMENT (OUTPATIENT)
Dept: OBGYN | Facility: CLINIC | Age: 24
End: 2024-05-16
Payer: COMMERCIAL

## 2024-05-16 DIAGNOSIS — O02.1 MISSED ABORTION: ICD-10-CM

## 2024-05-16 DIAGNOSIS — Z09 ENCOUNTER FOR FOLLOW-UP EXAMINATION AFTER COMPLETED TREATMENT FOR CONDITIONS OTHER THAN MALIGNANT NEOPLASM: ICD-10-CM

## 2024-05-16 PROCEDURE — 99213 OFFICE O/P EST LOW 20 MIN: CPT | Mod: 95

## 2024-05-17 ENCOUNTER — APPOINTMENT (OUTPATIENT)
Dept: ORTHOPEDIC SURGERY | Facility: CLINIC | Age: 24
End: 2024-05-17
Payer: COMMERCIAL

## 2024-05-17 VITALS — BODY MASS INDEX: 26.31 KG/M2 | WEIGHT: 134 LBS | HEIGHT: 60 IN

## 2024-05-17 DIAGNOSIS — M79.645 PAIN IN LEFT FINGER(S): ICD-10-CM

## 2024-05-17 DIAGNOSIS — S60.042A CONTUSION OF LEFT RING FINGER W/OUT DAMAGE TO NAIL, INITIAL ENCOUNTER: ICD-10-CM

## 2024-05-17 DIAGNOSIS — S60.032A CONTUSION OF LEFT MIDDLE FINGER W/OUT DAMAGE TO NAIL, INITIAL ENCOUNTER: ICD-10-CM

## 2024-05-17 PROCEDURE — 99213 OFFICE O/P EST LOW 20 MIN: CPT

## 2024-05-17 PROCEDURE — 73140 X-RAY EXAM OF FINGER(S): CPT | Mod: LT

## 2024-05-17 NOTE — PHYSICAL EXAM
[3rd] : 3rd [4th] : 4th [Middle Phalanx] : middle phalanx [Left] : left fingers [There are no fractures, subluxations or dislocations. No significant abnormalities are seen] : There are no fractures, subluxations or dislocations. No significant abnormalities are seen

## 2024-05-17 NOTE — DISCUSSION/SUMMARY
[de-identified] : Discussed the nature of the diagnosis and risk and benefits of different modalities of treatment. LT middle and ring finger x-rays reviewed and discussed.  No visible fx Hand contusion. Reassured. PRN.
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
## 2024-05-28 LAB — CHROM ANALY OVERALL INTERP SPEC-IMP: SIGNIFICANT CHANGE UP

## 2024-05-29 NOTE — H&P PST ADULT - PROBLEM SELECTOR PROBLEM 1
[facility]
Displaced fracture of medial cuneiform of left foot, subsequent encounter for fracture with routine healing

## 2024-06-17 NOTE — ASU PREOP CHECKLIST - BOWEL PREP
Called Mellissa back, she discussed sacral wound is approximately 3 cm deep, will change to hydrophera blue spiraled, one piece to wound, cover dressing, change 3 times per week by [facility] RN.  Mellissa was thankful, no further questions at this time.      
Mellissa with [facility] left voice message 6/17/2024 states she is needing wound care orders for patient.If you have further questions please call Mellissa at [phone].  Staff documented call and routed to Dr. Perales 6/17/2024  
n/a

## 2024-06-19 ENCOUNTER — APPOINTMENT (OUTPATIENT)
Dept: ANTEPARTUM | Facility: CLINIC | Age: 24
End: 2024-06-19

## 2024-07-03 ENCOUNTER — APPOINTMENT (OUTPATIENT)
Dept: ORTHOPEDIC SURGERY | Facility: CLINIC | Age: 24
End: 2024-07-03
Payer: COMMERCIAL

## 2024-07-03 VITALS — BODY MASS INDEX: 25.52 KG/M2 | WEIGHT: 130 LBS | HEIGHT: 60 IN

## 2024-07-03 DIAGNOSIS — S63.631A SPRAIN OF INTERPHALANGEAL JOINT OF LEFT INDEX FINGER, INITIAL ENCOUNTER: ICD-10-CM

## 2024-07-03 DIAGNOSIS — M79.645 PAIN IN LEFT FINGER(S): ICD-10-CM

## 2024-07-03 PROCEDURE — 99213 OFFICE O/P EST LOW 20 MIN: CPT | Mod: 25

## 2024-07-03 PROCEDURE — 29280 STRAPPING OF HAND OR FINGER: CPT | Mod: LT

## 2024-07-03 PROCEDURE — 73140 X-RAY EXAM OF FINGER(S): CPT | Mod: LT

## 2024-07-31 ENCOUNTER — APPOINTMENT (OUTPATIENT)
Dept: ORTHOPEDIC SURGERY | Facility: CLINIC | Age: 24
End: 2024-07-31

## 2024-07-31 ENCOUNTER — NON-APPOINTMENT (OUTPATIENT)
Age: 24
End: 2024-07-31

## 2024-07-31 VITALS — BODY MASS INDEX: 25.52 KG/M2 | WEIGHT: 130 LBS | HEIGHT: 60 IN

## 2024-07-31 DIAGNOSIS — S63.631A SPRAIN OF INTERPHALANGEAL JOINT OF LEFT INDEX FINGER, INITIAL ENCOUNTER: ICD-10-CM

## 2024-07-31 PROCEDURE — 99213 OFFICE O/P EST LOW 20 MIN: CPT

## 2024-08-06 NOTE — ASSESSMENT
[FreeTextEntry1] : The patient was advised of the diagnosis. The natural history of the pathology was explained in full to the patient in layman's terms. All questions were answered. The risks and benefits of surgical and non-surgical treatment alternatives were explained in full to the patient. activity as tolerated

## 2024-08-06 NOTE — HISTORY OF PRESENT ILLNESS
[de-identified] : 7/31/24; improved pain in the L IF PIP  7/3/2024: pt here with complaint of left index finger pain to the PIP joint x 3 weeks There is no hx of known trauma. Pt denies numbness/tingling.  PMH: Denied Allergies: NKDA

## 2024-08-06 NOTE — HISTORY OF PRESENT ILLNESS
[de-identified] : 7/31/24; improved pain in the L IF PIP  7/3/2024: pt here with complaint of left index finger pain to the PIP joint x 3 weeks There is no hx of known trauma. Pt denies numbness/tingling.  PMH: Denied Allergies: NKDA

## 2024-08-06 NOTE — HISTORY OF PRESENT ILLNESS
[de-identified] : 7/31/24; improved pain in the L IF PIP  7/3/2024: pt here with complaint of left index finger pain to the PIP joint x 3 weeks There is no hx of known trauma. Pt denies numbness/tingling.  PMH: Denied Allergies: NKDA

## 2024-08-06 NOTE — IMAGING
[de-identified] : left index finger with no skin changes or bony deformity. improved TTP over PIP RCL with no ligamentous laxity. all digits are nvi with FAROM  and intrinsic strength is 5/5  wrist with full and pain free ROM.

## 2024-08-06 NOTE — IMAGING
[de-identified] : left index finger with no skin changes or bony deformity. improved TTP over PIP RCL with no ligamentous laxity. all digits are nvi with FAROM  and intrinsic strength is 5/5  wrist with full and pain free ROM.

## 2024-08-06 NOTE — IMAGING
[de-identified] : left index finger with no skin changes or bony deformity. improved TTP over PIP RCL with no ligamentous laxity. all digits are nvi with FAROM  and intrinsic strength is 5/5  wrist with full and pain free ROM.

## 2024-08-22 ENCOUNTER — APPOINTMENT (OUTPATIENT)
Dept: ORTHOPEDIC SURGERY | Facility: CLINIC | Age: 24
End: 2024-08-22

## 2024-08-23 NOTE — ED PEDIATRIC NURSE NOTE - GASTROINTESTINAL ASSESSMENT
Chief Complaint   Patient presents with   • Office Visit   • Follow-up     Wegovy, possibly blood work        Subjective  HPI     Naya Venegas is an 46 year old y/o female with PMHx of hypothyroidism, obesity, ALICIA, exercise induced asthma, eczema, seasonal allergies, s/p cholecystectomy, former smoker who presents for follow-up on medication refill for weight management.    Interval hx:  OV 5/31/24: cc weight management, start weight 112kg (247lb) - wegovy vs. Wellbutrin/naltrexone, [place] approved for wegovy 0.25mg  OV 6/28/24 108.4kg (239lb)  OV 7/19/24 105.9kg (233lb)    #Obesity  -Current wegovy 1mg injecting Monday evenings - last dose (4th)  -side effects: acid reflux - feeling fullness/regurgitation and tiredness in first 2 days   -taking OTC famotidine 20mg in afternoon - helping with sensation   -September 2nd-15th [place]  -diet: went on vacation - clam chowder, got sick ~2.5 weeks  -exercise: decreased due to fatigue - [employer] 3x/week (~1 hour) - trying to     Shx:  -denies smoking/vaping (quit 8 years ago),   -intermittent gummy marijuana (last time 1 week ago, once every 3 months)  -social alcohol use (2 glasses of wine/week 1-2x)      #Smell Disturbance - smelling of smoke; CT sinuses wo contrast scheduled, referral to ENT - stopped after travel to [place]     ROS   Denies fever, chills, night sweats, recent illnesses; will be traveling to [place] and [place] in Sept (2 weeks, [place]), abdominal pain, n/v, diarrhea/constipation, urinary problems, swelling or other rashes      Allergies   Allergen Reactions   • Seasonal Other (See Comments)       Current Outpatient Medications   Medication Sig   • semaglutide-Weight Management (WEGOVY) 1 MG/0.5ML injection Inject 1 mg into the skin every 7 days. Indications: OBESITY For the third month increase to 1mg   • Levoxyl 50 MCG tablet TAKE 1 TABLET BY MOUTH ON TUESDAY, WEDNESDAY, THURSDAY, SATURDAY, AND SUNDAY. TAKE 2 TABLETS BY MOUTH ON MONDAY AND FRIDAY    • escitalopram (LEXAPRO) 10 MG tablet Take 1 tablet by mouth daily.   • albuterol 108 (90 Base) MCG/ACT inhaler Inhale 2 puffs into the lungs every 4 hours as needed for Wheezing.   • Ascorbic Acid (VITAMIN C PO) Take by mouth daily.   • ZINC SULFATE PO Take by mouth daily.   • Ferrous Sulfate (Iron) 28 MG Tab Take by mouth every other day.   • Probiotic Product (PROBIOTIC DAILY PO)  (Patient not taking: Reported on 2024)   • saline (SIMPLY SALINE) 0.9 % Aero Soln Spray 3 sprays in each nostril 4 times daily as needed (nasal congestion).   • TURMERIC PO Take by mouth daily.    • Cholecalciferol (VITAMIN D3 PO) Take by mouth daily.   • albuterol 108 (90 Base) MCG/ACT inhaler Inhale 2 puffs into the lungs every 4 hours as needed for Shortness of Breath or Wheezing.     No current facility-administered medications for this visit.       Past Medical History:   Diagnosis Date   • Anxiety    • Asthma (CMD)    • Asthma-COPD overlap syndrome  (CMD)    • Eczema of face    • Hypothyroidism    • RAD (reactive airway disease) (CMD)        Past Surgical History:   Procedure Laterality Date   • Cholecystectomy         Social History     Socioeconomic History   • Marital status: Single     Spouse name: Not on file   • Number of children: Not on file   • Years of education: Not on file   • Highest education level: Not on file   Occupational History   • Not on file   Tobacco Use   • Smoking status: Former     Current packs/day: 0.00     Types: Cigarettes     Quit date: 2016     Years since quittin.5   • Smokeless tobacco: Never   • Tobacco comments:     quit 3 years ago   Vaping Use   • Vaping status: never used   Substance and Sexual Activity   • Alcohol use: Yes     Alcohol/week: 3.0 standard drinks of alcohol     Types: 3 Glasses of wine per week     Comment: oc   • Drug use: Yes     Comment: thc   • Sexual activity: Not on file   Other Topics Concern   • Not on file   Social History Narrative   • Not on file      Social Determinants of Health     Financial Resource Strain: Low Risk  (7/17/2024)    Financial Resource Strain    • Unable to Get: None   Food Insecurity: Low Risk  (7/17/2024)    Food Insecurity    • Worried about Food: Never true    • Food is Gone: Never true   Transportation Needs: Not At Risk (7/17/2024)    Transportation Needs    • Lack of Reliable Transportation: No   Physical Activity: Low Risk  (7/17/2024)    Exercise Vital Sign    • Days of Exercise per Week: 4 days    • Minutes of Exercise per Session: 60 min   Stress: Low Risk  (7/17/2024)    Stress    • How Stressed: A little bit   Social Connections: Low Risk  (7/17/2024)    Social Connections    • Social Connectivity: 5 or more times a week   Interpersonal Safety: Not on file       Objective    Vitals:    08/23/24 1611   BP: 92/64   Pulse: 65   Temp: 97.9 °F (36.6 °C)       Weight: 104.8kg (231lb) - (-8lbs)    Physical Exam  Constitutional:       General: She is not in acute distress.     Appearance: She is obese. She is not ill-appearing, toxic-appearing or diaphoretic.   HENT:      Head: Normocephalic and atraumatic.      Right Ear: Tympanic membrane, ear canal and external ear normal.      Left Ear: Tympanic membrane, ear canal and external ear normal.      Nose:      Comments: Boggy erythematous BL nasal turbinates     Mouth/Throat:      Mouth: Mucous membranes are moist.      Pharynx: Posterior oropharyngeal erythema present.      Comments: Cobblestoning present in oropharynx  Eyes:      General: No scleral icterus.        Right eye: No discharge.         Left eye: No discharge.   Cardiovascular:      Rate and Rhythm: Normal rate and regular rhythm.      Pulses: Normal pulses.      Heart sounds: Normal heart sounds.   Pulmonary:      Effort: Pulmonary effort is normal. No respiratory distress.      Breath sounds: Normal breath sounds. No stridor. No wheezing or rales.   Abdominal:      General: Bowel sounds are normal.      Palpations:  Abdomen is soft.      Tenderness: There is no abdominal tenderness. There is no guarding or rebound.   Musculoskeletal:         General: No swelling.      Cervical back: Neck supple.   Skin:     General: Skin is warm and dry.      Capillary Refill: Capillary refill takes less than 2 seconds.      Coloration: Skin is not jaundiced.      Findings: No rash.   Neurological:      General: No focal deficit present.      Mental Status: She is alert and oriented to person, place, and time.       Assessment & Plan      Encounter for therapeutic drug monitoring  Class 1 obesity due to excess calories without serious comorbidity with body mass index (BMI) of 33.0 to 33.9 in adult  Globus sensation - most likely 2/2 wegovy given slowed transit  - 104.8kg today (prior 105.9kg). No updates on Fhx x thyroid cancer/MEN or personal hx of pancreatitis. Given globus sensation, recommend staying at 1mg weekly and recommend follow up in 4 weeks to reassess GI sxs. For supportive care, can utilize pepcid/prilosec OTC  - Medication:   - semaglutide-Weight Management (WEGOVY) 1 MG/0.5ML injection; Inject 1 mg into the skin every 7 days. Indications: OBESITY For the third month increase to 1mg  Dispense: 2 mL; Refill: 0    Smell disturbance  Phantosmia  - Recommend fu with ENT & CT sinuses despite resolved sxs      Wegovy was approved by the FDA on June 2021, as an adjunct to a reduced-calorie diet and increased physical activity for chronic weight management in adult patients with an initial body mass index (BMI) of 30 kg/m2 or greater with weight-related comorbid condition of metabolic syndrome. It is well recognized that obesity is a chronic illness associated with many related diseases, such as diabetes, dyslipidemia and hypertension.      Medication will be used with a reduced calorie diet and increased physical activity     Side effect of GLP-1 analog discussed.  The pt was informed about common side effects of GI upset and weight  loss as well as rare but serious side effects of pancreatitis.  Pt was made aware of reported risk of medullary thyroid cancer in animal studies. NO personal history of pancreatitis. No personal or FHX of thyroid cancer/Men 2B syndrome     Medical compliance with plan discussed and risks of non-compliance reviewed.  Patient education completed on disease process, etiology & prognosis.   Patient expresses understanding of the plan.   Proper usage and side effects of medications reviewed & discussed.   Return to clinic as clinically indicated as discussed with patient who verbalized understanding of & agreement with the plan.      - Return in about 1 month (around 9/23/2024) for (with Dr. Otero). Weight check in, wegovy     Discussed with Dr. Tristan Davis,  Asia Otero, DO  Family Medicine, PGY-3   [facility]

## 2024-09-15 ENCOUNTER — NON-APPOINTMENT (OUTPATIENT)
Age: 24
End: 2024-09-15

## 2024-09-26 NOTE — ED ADULT TRIAGE NOTE - TEMPERATURE IN CELSIUS (DEGREES C)
[Time Spent: ___ minutes] : I have spent [unfilled] minutes of time on the encounter which excludes teaching and separately reported services.
36.7

## 2025-03-28 ENCOUNTER — APPOINTMENT (OUTPATIENT)
Dept: ORTHOPEDIC SURGERY | Facility: CLINIC | Age: 25
End: 2025-03-28
Payer: COMMERCIAL

## 2025-03-28 DIAGNOSIS — S63.659A SPRAIN OF METACARPOPHALANGEAL JOINT OF UNSPECIFIED FINGER, INITIAL ENCOUNTER: ICD-10-CM

## 2025-03-28 DIAGNOSIS — S63.653A SPRAIN OF METACARPOPHALANGEAL JOINT OF LEFT MIDDLE FINGER, INITIAL ENCOUNTER: ICD-10-CM

## 2025-03-28 PROCEDURE — 99213 OFFICE O/P EST LOW 20 MIN: CPT

## 2025-04-15 ENCOUNTER — APPOINTMENT (OUTPATIENT)
Dept: ORTHOPEDIC SURGERY | Facility: CLINIC | Age: 25
End: 2025-04-15
Payer: COMMERCIAL

## 2025-04-15 DIAGNOSIS — S63.659A SPRAIN OF METACARPOPHALANGEAL JOINT OF UNSPECIFIED FINGER, INITIAL ENCOUNTER: ICD-10-CM

## 2025-04-15 DIAGNOSIS — S63.653A SPRAIN OF METACARPOPHALANGEAL JOINT OF LEFT MIDDLE FINGER, INITIAL ENCOUNTER: ICD-10-CM

## 2025-04-15 PROCEDURE — 99213 OFFICE O/P EST LOW 20 MIN: CPT

## 2025-05-09 ENCOUNTER — APPOINTMENT (OUTPATIENT)
Dept: ORTHOPEDIC SURGERY | Facility: CLINIC | Age: 25
End: 2025-05-09

## 2025-05-09 VITALS — HEIGHT: 60 IN | WEIGHT: 133 LBS | BODY MASS INDEX: 26.11 KG/M2

## 2025-05-09 DIAGNOSIS — S63.653A SPRAIN OF METACARPOPHALANGEAL JOINT OF LEFT MIDDLE FINGER, INITIAL ENCOUNTER: ICD-10-CM

## 2025-05-09 DIAGNOSIS — S63.659A SPRAIN OF METACARPOPHALANGEAL JOINT OF UNSPECIFIED FINGER, INITIAL ENCOUNTER: ICD-10-CM

## 2025-05-09 PROCEDURE — 99212 OFFICE O/P EST SF 10 MIN: CPT

## 2025-05-17 ENCOUNTER — RESULT CHARGE (OUTPATIENT)
Age: 25
End: 2025-05-17

## 2025-05-17 ENCOUNTER — APPOINTMENT (OUTPATIENT)
Dept: ORTHOPEDIC SURGERY | Facility: CLINIC | Age: 25
End: 2025-05-17
Payer: COMMERCIAL

## 2025-05-17 VITALS — WEIGHT: 133 LBS | HEIGHT: 60 IN | BODY MASS INDEX: 26.11 KG/M2

## 2025-05-17 PROBLEM — S63.641A SPRAIN OF METACARPOPHALANGEAL (MCP) JOINT OF RIGHT THUMB, INITIAL ENCOUNTER: Status: ACTIVE | Noted: 2025-05-17

## 2025-05-17 PROCEDURE — 73130 X-RAY EXAM OF HAND: CPT | Mod: RT

## 2025-05-17 PROCEDURE — 99213 OFFICE O/P EST LOW 20 MIN: CPT

## 2025-05-20 ENCOUNTER — APPOINTMENT (OUTPATIENT)
Dept: ORTHOPEDIC SURGERY | Facility: CLINIC | Age: 25
End: 2025-05-20
Payer: COMMERCIAL

## 2025-05-20 PROCEDURE — 99213 OFFICE O/P EST LOW 20 MIN: CPT

## 2025-05-30 ENCOUNTER — APPOINTMENT (OUTPATIENT)
Dept: ORTHOPEDIC SURGERY | Facility: CLINIC | Age: 25
End: 2025-05-30
Payer: COMMERCIAL

## 2025-05-30 DIAGNOSIS — S63.659A SPRAIN OF METACARPOPHALANGEAL JOINT OF UNSPECIFIED FINGER, INITIAL ENCOUNTER: ICD-10-CM

## 2025-05-30 DIAGNOSIS — S63.641A SPRAIN OF METACARPOPHALANGEAL JOINT OF RIGHT THUMB, INITIAL ENCOUNTER: ICD-10-CM

## 2025-05-30 PROCEDURE — 99212 OFFICE O/P EST SF 10 MIN: CPT

## (undated) DEVICE — IRRISEPT JET LAVAGE W 0.05 PCT CHG

## (undated) DEVICE — DRAPE C-ARM 42X64 W/BANDS

## (undated) DEVICE — DRAPE LIGHT HANDLE COVER (GREEN)

## (undated) DEVICE — DRSG ACE BANDAGE 6"

## (undated) DEVICE — WRAP COMPRESSION CALF MED

## (undated) DEVICE — VACUUM CURETTE BERKLEY OLYMPUS CURVED 9MM

## (undated) DEVICE — DRAPE IOBAN 23X23"

## (undated) DEVICE — GOWN TRIMAX LG

## (undated) DEVICE — BLANKET WARMER UPPER ADULT

## (undated) DEVICE — VACUUM CURETTE BUSSE HOSP CURVED 14MM

## (undated) DEVICE — VACUUM CURETTE BERKLEY OLYMPUS CURVED 11MM

## (undated) DEVICE — SUT VICRYL 3-0 27" RB-1 UNDYED

## (undated) DEVICE — VACUUM CURETTE BERKLEY OLYMPUS CURVED 7MM

## (undated) DEVICE — SUT MONOCRYL 4-0 18" P-3 UNDYED

## (undated) DEVICE — DRSG WEBRIL 6"

## (undated) DEVICE — GLV 7.5 PROTEXIS

## (undated) DEVICE — VACUUM CURETTE BERKLEY OLYMPUS CURVED 16MM X 1/2"

## (undated) DEVICE — VACUUM CURETTE BUSSE HOSP STRAIGHT 7MM

## (undated) DEVICE — SUT POLYSORB 0 30" GS-10 UNDYED

## (undated) DEVICE — VACUUM CURETTE BERKLEY OLYMPUS CURVED 12MM

## (undated) DEVICE — PACK FOOT CUST

## (undated) DEVICE — SOL IRR POUR NS 0.9% 500ML

## (undated) DEVICE — WARMING BLANKET UPPER ADULT

## (undated) DEVICE — VACUUM CURETTE MEDGYN CURVED 13MM

## (undated) DEVICE — VACUUM CURETTE BUSSE HOSP CURVED 12MM

## (undated) DEVICE — CUFF TOURNIQUET 34" DUAL PORT W PLC

## (undated) DEVICE — DRAPE TOWEL BLUE 17" X 24"

## (undated) DEVICE — ELCTR EDGE BOVIE INSULATED BLADE TIP 2.75"

## (undated) DEVICE — PACK LITHOTOMY

## (undated) DEVICE — DRAPE 3/4 SHEET 52X76"

## (undated) DEVICE — DRSG COBAN 4"

## (undated) DEVICE — VACUUM CURETTE BUSSE HOSP CURVED 9MM

## (undated) DEVICE — DRILL BIT WRIGHT MEDICAL 2.6MM

## (undated) DEVICE — DRAPE 1/2 SHEET 40X57"

## (undated) DEVICE — VACUUM CURETTE BUSSE HOSP CURVED 10MM

## (undated) DEVICE — SPONGE LAP PAD W RING 18X18"

## (undated) DEVICE — BLADE SAFETY LOCK #15

## (undated) DEVICE — DRSG ACE BANDAGE 4"

## (undated) DEVICE — DRSG WEBRIL 4"

## (undated) DEVICE — PACK LOWER EXTREMITY

## (undated) DEVICE — VACUUM CURETTE BERKLEY OLYMPUS F TIP 6MM

## (undated) DEVICE — DRSG KLING 2"

## (undated) DEVICE — DRAPE TOP SHEET 53"X101"

## (undated) DEVICE — BLANKET WARMER FULL ADULT

## (undated) DEVICE — VACUUM CURETTE BERKLEY OLYMPUS CURVED 8MM

## (undated) DEVICE — SUT MONOSOF 3-0 18" P-12

## (undated) DEVICE — GLV 6.5 PROTEXIS (WHITE)

## (undated) DEVICE — SYR ASEPTO

## (undated) DEVICE — DRSG XEROFORM 5"

## (undated) DEVICE — DRSG STOCKINETTE TUBULAR COTTON 2PLY 6X72"

## (undated) DEVICE — GLV 7.5 ESTEEM BLUE

## (undated) DEVICE — TUBING UTERINE ASPIRATION 3/8" X 6FT W/O ADAPTER

## (undated) DEVICE — DRAPE C ARMOUR

## (undated) DEVICE — VACUUM CURETTE BUSSE HOSP CURVED 11MM

## (undated) DEVICE — DRSG STERISTRIPS 0.5X4"

## (undated) DEVICE — SUT POLYSORB 2-0 30" GS-10 UNDYED

## (undated) DEVICE — GOWN TRIMAX XXL

## (undated) DEVICE — SOCK SPECIMEN 3/8"-1/2" MALE PORT